# Patient Record
Sex: FEMALE | Race: WHITE
[De-identification: names, ages, dates, MRNs, and addresses within clinical notes are randomized per-mention and may not be internally consistent; named-entity substitution may affect disease eponyms.]

---

## 2017-02-09 ENCOUNTER — HOSPITAL ENCOUNTER (OUTPATIENT)
Dept: HOSPITAL 74 - FMAMMOTONE | Age: 42
Discharge: HOME | End: 2017-02-09
Payer: COMMERCIAL

## 2017-02-09 DIAGNOSIS — N60.12: ICD-10-CM

## 2017-02-09 DIAGNOSIS — D24.2: Primary | ICD-10-CM

## 2017-02-09 PROCEDURE — 0HBU0ZX EXCISION OF LEFT BREAST, OPEN APPROACH, DIAGNOSTIC: ICD-10-PCS

## 2017-02-10 NOTE — PATH
Surgical Pathology Report



Patient Name:  LEXY YI

Accession #:  

[facility]. Rec. #:  [id number]                                                        

   /Age/Gender:  1975 (Age: 41) / F

Account:  [id number]                                                          

             Location: [facility]

Taken:  2017

Received:  2017

Reported:  2/10/2017

Physicians:  Galdino Luna M.D.

  



Specimen(s) Received

 LEFT BREAST SPECIMEN WITH MASS 





Clinical History

Nonpalpable lesion

Mammographic findings: Suspicious







Final Diagnosis

LEFT BREAST, STEREOTACTIC NEEDLE CORE BIOPSY:

FIBROADENOMA, AND BENIGN BREAST TISSUE WITH FIBROCYSTIC CHANGES INCLUDING

STROMAL FIBROSIS AND DUCTAL DILATATION WITH RARE ASSOCIATED CALCIFICATION.



Comment:  Recommend correlation with clinical and radiologic findings and follow

up as clinically indicated.





***Electronically Signed***

Prashant Massey M.D.





Gross Description

Received in formalin, labeled "left breast with mass," are multiple tan-yellow,

cylindrical portions of fibroadipose tissue ranging from 0.5-1.8 cm. in length

and averaging 0.2 cm. in diameter. The specimen is submitted in toto in 4

cassette. 

Time to formalin fixation: 5 minutes

Total formalin fixation time: Approximately 9.5 hours.

[facility]/2017



[facility]/2017

## 2017-06-23 ENCOUNTER — HOSPITAL ENCOUNTER (INPATIENT)
Dept: HOSPITAL 74 - JSAMEDAYSX | Age: 42
LOS: 1 days | Discharge: HOME | DRG: 621 | End: 2017-06-24
Attending: SURGERY | Admitting: SURGERY
Payer: COMMERCIAL

## 2017-06-23 VITALS — BODY MASS INDEX: 34.2 KG/M2

## 2017-06-23 DIAGNOSIS — J45.909: ICD-10-CM

## 2017-06-23 DIAGNOSIS — E06.3: ICD-10-CM

## 2017-06-23 DIAGNOSIS — Z71.3: ICD-10-CM

## 2017-06-23 DIAGNOSIS — E66.01: Primary | ICD-10-CM

## 2017-06-23 DIAGNOSIS — K21.9: ICD-10-CM

## 2017-06-23 DIAGNOSIS — Z87.891: ICD-10-CM

## 2017-06-23 LAB
ALBUMIN SERPL-MCNC: 3.9 G/DL (ref 3.4–5)
ALP SERPL-CCNC: 60 U/L (ref 45–117)
ALT SERPL-CCNC: 32 U/L (ref 12–78)
ANION GAP SERPL CALC-SCNC: 8 MMOL/L (ref 8–16)
AST SERPL-CCNC: 20 U/L (ref 15–37)
BILIRUB SERPL-MCNC: 0.6 MG/DL (ref 0.2–1)
CALCIUM SERPL-MCNC: 8.3 MG/DL (ref 8.5–10.1)
CO2 SERPL-SCNC: 27 MMOL/L (ref 21–32)
CREAT SERPL-MCNC: 0.9 MG/DL (ref 0.55–1.02)
DEPRECATED RDW RBC AUTO: 14.2 % (ref 11.6–15.6)
GLUCOSE SERPL-MCNC: 163 MG/DL (ref 74–106)
MCH RBC QN AUTO: 29.7 PG (ref 25.7–33.7)
MCHC RBC AUTO-ENTMCNC: 33.4 G/DL (ref 32–36)
MCV RBC: 89 FL (ref 80–96)
PLATELET # BLD AUTO: 151 K/MM3 (ref 134–434)
PMV BLD: 8.7 FL (ref 7.5–11.1)
PROT SERPL-MCNC: 6.9 G/DL (ref 6.4–8.2)
WBC # BLD AUTO: 12.7 K/MM3 (ref 4–10)

## 2017-06-23 PROCEDURE — 0DB64Z3 EXCISION OF STOMACH, PERCUTANEOUS ENDOSCOPIC APPROACH, VERTICAL: ICD-10-PCS | Performed by: SURGERY

## 2017-06-23 RX ADMIN — SODIUM CHLORIDE SCH MLS: 9 INJECTION, SOLUTION INTRAVENOUS at 17:00

## 2017-06-23 RX ADMIN — ENOXAPARIN SODIUM SCH MG: 40 INJECTION SUBCUTANEOUS at 21:12

## 2017-06-23 RX ADMIN — HYDROMORPHONE HYDROCHLORIDE PRN MG: 1 INJECTION, SOLUTION INTRAMUSCULAR; INTRAVENOUS; SUBCUTANEOUS at 21:12

## 2017-06-23 RX ADMIN — FAMOTIDINE SCH MLS/HR: 20 INJECTION, SOLUTION INTRAVENOUS at 21:12

## 2017-06-23 NOTE — HP
DATE OF ADMISSION:  06/23/2017

 

CHIEF COMPLAINT:  Morbid obesity.

 

HISTORY OF PRESENT ILLNESS:  This patient is a 41-year-old woman with a history of

morbid obesity despite many attempts at dietary weight loss.  The patient also has

sleep apnea, which is significant because of her morbid obesity, and she was worked

up with cardiac, nutrition, psychological evaluations and clearances prior to

undergoing elective sleeve gastrectomy surgery.  

 

PAST MEDICAL HISTORY:  Significant for:

1.  Hypothyroidism. 

2.  Anxiety.

 

PAST SURGICAL HISTORY:  Significant for:

1.  Laparoscopic cholecystectomy.

2.  Hysterectomy.

3.  Tubal ligation. 

 

ALLERGIES:  Patient has no known allergies.

 

REVIEW OF SYSTEMS:  

1.  Review of the pulmonary system within normal limits.

2.  Review of the cardiovascular within normal limits.

3.  Review of the gastrointestinal system within normal limits.

4.  Review of the musculoskeletal system within normal limits.

 

PHYSICAL EXAMINATION:

General:  Patient is awake, alert, slightly to moderately anxious, in no acute

distress.

HEENT:  No masses palpated.  

Lungs:  Clear bilaterally.

Heart:  Regular sinus rhythm.

Abdomen:  Well-healed incisions, positive for obesity, soft, nontender on palpation. 

 

Extremities:  Within normal limits.  

 

IMPRESSION:  Morbid obesity.  

 

PLAN:  OR for laparoscopic vertical sleeve gastrectomy. 

 

 

TL [id number]

DD: 06/23/2017 11:45

DT: 06/23/2017 12:32

Job #:  [id number]

## 2017-06-23 NOTE — OP
DATE OF OPERATION:  06/23/2017

 

PREOPERATIVE DIAGNOSES:

1.  Morbid obesity.

2.  Sleep apnea.

 

POSTOPERATIVE DIAGNOSES: 

1.  Morbid obesity.

2.  Sleep apnea.

3.  Abdominal adhesions.

 

PROCEDURE PERFORMED:  

1.  Laparoscopic vertical sleeve gastrectomy.

2.  Laparoscopic lysis of adhesions.

3.  Diagnostic laparoscopy.

 

OPERATING SURGEON:  Kt Sarmiento MD 

 

ASSISTANT:  Wilfrido Gauthier MD

 

ANESTHESIA:  General.

 

EXPECTED BLOOD LOSS:  30 mL.

 

DESCRIPTION OF PROCEDURE:  The patient was brought into the operating room, placed on

the OR table in a supine position.  All precautions were taken initially including

padding for the back and the feet, and Venodyne boots were placed on both lower

extremities.  At that point, the abdomen was prepped and draped in the usual manner. 

A Veress needle was placed in the left upper quadrant, and a pneumoperitoneum was

established.  A No. 12 bladeless trocar was placed in the left upper quadrant. 

Through that trocar, laparoscopic camera was placed.  Upon placing the camera, there

was noted to be a large amount of adhesions in the left upper quadrant, which was

going to reduce the visibility of the stomach.  A No. 15 bladeless trocar was then

placed in the midline in a supraumbilical position and a No. 5 bladeless trocar in

the right upper quadrant.  Placing the camera now through the No. 15 trocar in the

midline and using a No. 5 right upper quadrant trocar, the adhesions in the left

upper quadrant were lysed with laparoscopic scissors.  Once they were completely

freed up, the left upper quadrant was now free of adhesions, and visualization of the

stomach was more proper.  At this point, a No. 5 bladeless trocar was placed in the

left upper quadrant below the left costal margin.  A Stephen liver retractor was

placed in the epigastrium to retract the left lobe of the liver.

 

The patient was then placed in 20-degree reverse Trendelenburg position, and the

pylorus was found on the distal stomach.  Then 6 cm was measured proximally to there,

and there on the greater curve with the operating surgeon lifting the stomach up

toward the anterior abdominal wall, the assistant surgeon retracted the gastrocolic

ligament inferiorly.  The LigaSure device was used to dissect the gastrocolic

ligament off the greater curve of the stomach.  This continued in a superior and

vertical direction as I continued to dissect the short gastric vessels off the

greater curve until a final short gastric vessel was divided between the superior

pole of the spleen and the proximal fundus.

 

At this juncture, Anesthesia removed the orogastric tube and placed a No. 40 bougie. 

With the bougie held along the lesser curve, a series of staples was performed with

the first 2 staples being backloaded 6 cm in length along the bougie.  This was

followed by a series of purple load staples continuing until a final staple was fired

in the left upper quadrant and the greater curve was now completely detached from the

lesser curve.  At this juncture, saline was placed around the staple line. 

Anesthesia inserted air into the bougie, which showed the entire stomach distended

down to the pylorus.  No obstruction and no leaks were noted.  At this point, the

resected greater curve was removed through the No. 15 trocar site and sent off the

field as specimen.  Under direct vision, the No. 15 and No. 12 trocars were closed

with Endo Close device to prevent internal hernia and prevent bleeding.  Under direct

vision, all trocars were removed and pneumoperitoneum was released.

 

All trocar sites then received 0.25% Marcaine and were closed with 4-0 Biosyn in

subcuticular fashion.  Dressings were applied.  The patient was awoken from

anesthesia and transferred out of the operating room to the recovery room in stable

condition.  

 

 

 

TL [id number]

DD: 06/23/2017 11:42

DT: 06/23/2017 13:33

Job #:  [id number]

## 2017-06-24 VITALS — HEART RATE: 49 BPM | TEMPERATURE: 98.6 F | SYSTOLIC BLOOD PRESSURE: 140 MMHG | DIASTOLIC BLOOD PRESSURE: 86 MMHG

## 2017-06-24 LAB
ALBUMIN SERPL-MCNC: 3.3 G/DL (ref 3.4–5)
ALP SERPL-CCNC: 47 U/L (ref 45–117)
ALT SERPL-CCNC: 24 U/L (ref 12–78)
ANION GAP SERPL CALC-SCNC: 9 MMOL/L (ref 8–16)
AST SERPL-CCNC: 16 U/L (ref 15–37)
BILIRUB SERPL-MCNC: 0.7 MG/DL (ref 0.2–1)
CALCIUM SERPL-MCNC: 7.7 MG/DL (ref 8.5–10.1)
CO2 SERPL-SCNC: 28 MMOL/L (ref 21–32)
CREAT SERPL-MCNC: 0.6 MG/DL (ref 0.55–1.02)
DEPRECATED RDW RBC AUTO: 13.5 % (ref 11.6–15.6)
GLUCOSE SERPL-MCNC: 87 MG/DL (ref 74–106)
MCH RBC QN AUTO: 30.6 PG (ref 25.7–33.7)
MCHC RBC AUTO-ENTMCNC: 34.4 G/DL (ref 32–36)
MCV RBC: 88.9 FL (ref 80–96)
PLATELET # BLD AUTO: 152 K/MM3 (ref 134–434)
PMV BLD: 9 FL (ref 7.5–11.1)
PROT SERPL-MCNC: 5.8 G/DL (ref 6.4–8.2)
WBC # BLD AUTO: 9.1 K/MM3 (ref 4–10)

## 2017-06-24 RX ADMIN — ACETAMINOPHEN PRN MG: 10 INJECTION, SOLUTION INTRAVENOUS at 09:59

## 2017-06-24 RX ADMIN — ACETAMINOPHEN PRN MG: 10 INJECTION, SOLUTION INTRAVENOUS at 01:45

## 2017-06-24 RX ADMIN — HYDROMORPHONE HYDROCHLORIDE PRN MG: 1 INJECTION, SOLUTION INTRAMUSCULAR; INTRAVENOUS; SUBCUTANEOUS at 01:45

## 2017-06-24 RX ADMIN — HYDROMORPHONE HYDROCHLORIDE PRN MG: 1 INJECTION, SOLUTION INTRAMUSCULAR; INTRAVENOUS; SUBCUTANEOUS at 06:32

## 2017-06-24 RX ADMIN — HYDROMORPHONE HYDROCHLORIDE PRN MG: 1 INJECTION, SOLUTION INTRAMUSCULAR; INTRAVENOUS; SUBCUTANEOUS at 11:08

## 2017-06-24 RX ADMIN — FAMOTIDINE SCH MLS/HR: 20 INJECTION, SOLUTION INTRAVENOUS at 10:01

## 2017-06-24 RX ADMIN — ENOXAPARIN SODIUM SCH MG: 40 INJECTION SUBCUTANEOUS at 10:02

## 2017-06-24 RX ADMIN — SODIUM CHLORIDE SCH MLS/HR: 9 INJECTION, SOLUTION INTRAVENOUS at 01:45

## 2017-06-24 NOTE — PN
Progress Note (short form)





- Note


Progress Note: 


POD#1





T(max)-100.2


Presently afebrile 


P-50-60


BP-124/68





Pt doing well


No N/V


C/O gas pain in epigastrium


OOB in chair, walking frequently


Using incentive spirometer





P/E- all incisions clean, dry





WBC-9.1


H/H-11.3/34.8





UGI- no leak, no obstruction





P- Begin PO clear liquids- 2 oz PO TID


    Encourage OOB


    Cont DVT prophylaxis

## 2017-06-25 NOTE — PN
Progress Note, Physician


Chief Complaint: 


s/p lap gastric sleeve under general anesthesia


History of Present Illness: 


post op day one





- Objective


Vital Signs: 


 Vital Signs











Temperature  98.6 F   06/24/17 14:00


 


Pulse Rate  49 L  06/24/17 14:00


 


Respiratory Rate  18   06/24/17 14:00


 


Blood Pressure  140/86   06/24/17 14:00


 


O2 Sat by Pulse Oximetry (%)  97   06/24/17 09:00











Constitutional: Yes: Well Nourished


Cardiovascular: Yes: WNL


Respiratory: Yes: WNL


Gastrointestinal: Yes: Abdomen, Obese, Tenderness


Labs: 


 CBC, BMP





 06/24/17 07:19 





 06/24/17 07:19 











Assessment/Plan


patient complaining of gas pain after swallow study, incision pain minimal, no 

nausea or vomiting, no other adverse effects of anesthetic, dept of anesthesia 

will sign off care at this time.

## 2017-06-26 NOTE — PATH
Surgical Pathology Report



Patient Name:  LEXY YI

Accession #:  [id number]

[facility]. Rec. #:  [id number]                                                        

   /Age/Gender:  1975 (Age: 41) / F

Account:  [id number]                                                          

             Location: [facility] [address] [facility]

Taken:  2017

Received:  2017

Reported:  2017

Physicians:  Kt Sarmiento M.D.

  



Specimen(s) Received

 GREATER CURVATURE STOMACH 





Clinical History

Morbid obesity







Final Diagnosis

STOMACH, GREATER CURVATURE, LAPAROSCOPIC VERTICAL SLEEVE GASTRECTOMY:

PORTION OF STOMACH WITH PATCHY MILD CHRONIC GASTRITIS.





***Electronically Signed***

Alexander Finkelstein, M.D.





Gross Description

Received in formalin, labeled "greater curvature of stomach" is a 101 gram, 24.0

x 6.5 x 5.0 cm portion of stomach with a stapled margin of resection. The serosa

is tan-gray with minimal attached fat. The mucosa is tan-pink with flattened

folds. No mucosal masses are identified. Representative sections are submitted

in one cassette.

/2017



[facility]

## 2020-01-12 ENCOUNTER — FORM ENCOUNTER (OUTPATIENT)
Age: 45
End: 2020-01-12

## 2020-01-13 ENCOUNTER — HOSPITAL ENCOUNTER (OUTPATIENT)
Dept: HOSPITAL 74 - JRADUS | Age: 45
Discharge: HOME | End: 2020-01-13
Payer: COMMERCIAL

## 2020-01-13 DIAGNOSIS — N60.21: ICD-10-CM

## 2020-01-13 DIAGNOSIS — D24.1: Primary | ICD-10-CM

## 2020-01-13 PROCEDURE — A4648 IMPLANTABLE TISSUE MARKER: HCPCS

## 2020-01-13 PROCEDURE — 0H9T3ZX DRAINAGE OF RIGHT BREAST, PERCUTANEOUS APPROACH, DIAGNOSTIC: ICD-10-PCS

## 2020-01-14 NOTE — PATH
Surgical Pathology Report



Patient Name:  LEXY YI

Accession #:  

Med. Rec. #:  [id number]                                                        

   /Age/Gender:  1975 (Age: 44) / F

Account:  [id number]                                                          

             Location: RADIOLOGY [facility]

Taken:  2020

Received:  2020

Reported:  2020

Physicians:  Galdino Ibanez M.D.

  



Specimen(s) Received

 U/S GUIDED 14G CORE BX OF LEFT BREAST 3:00 0.9 CM MASS 





Clinical History

Nonpalpable lesion

Postoperative diagnosis: Suspicious







Final Diagnosis

LEFT BREAST, 3:00, ULTRASOUND GUIDED NEEDLE BIOPSY:

SCLEROSING INTRADUCTAL PAPILLOMA WITH FOCAL ATYPIA, AND FIBROADENOMATOID

CHANGES.



Comment: This case was discussed with Dr. Gramajo at 2:18 PM on 2020.





***Electronically Signed***

Prashant Massey M.D.





Gross Description

Received in formalin labeled "left breast 3:00," are 4 tan-yellow, cylindrical

portions of fibroadipose tissue ranging from 0.6-1.2 cm in length and averaging

0.1 cm in diameter. The specimens are submitted in toto in one cassette.



Time to formalin fixation: Less than one minute

Total formalin fixation time: Approximately 8 hours.

DL/2020



saudi/2020

## 2020-02-03 ENCOUNTER — FORM ENCOUNTER (OUTPATIENT)
Age: 45
End: 2020-02-03

## 2020-02-05 ENCOUNTER — FORM ENCOUNTER (OUTPATIENT)
Age: 45
End: 2020-02-05

## 2020-02-05 NOTE — HP
Admitting History and Physical





- Primary Care Physician


PCP: Toney Bernal





- Admission


Chief Complaint: left breast atypia


History of Present Illness: 





Patient is a 43 yo female with dense breasts on mammo, who also underwent an US 

which was c/w a left breast mass at 3 oclock position.  Patient had a left US 

guided core bx on 2020 which was c/w intraductal papilloma with atypia. 

Patient is now presenting for a left breast WE with NL.   


History Source: Patient


Limitations to Obtaining History: No Limitations





- Past Medical History


Gastrointestinal: Yes: Irritable Bowel Disease


...LMP: 10


Endocrine: Yes: Hypothyroidism, Other (Hashimoto's thyroiditis)





- Past Surgical History


Past Surgical History: Yes: Appendectomy, Cholecystectomy,  (x2), 

Hysterectomy


Additional Past Surgical History: 





Gastric sleeve (2017)


laminectomy


Left knee ACL repair


Cystectomy x 5





- Smoking History


Smoking history: Former smoker


Have you smoked in the past 12 months: No


Aproximately how many cigarettes per day: 20


If you are a former smoker, when did you quit?: 2YRS AGO





- Alcohol/Substance Use


Hx Alcohol Use: Yes (SOCIALLY)





- Social History


Occupation: works in [employer] at [employer] 





Home Medications





- Allergies


Allergies/Adverse Reactions: 


 Allergies











Allergy/AdvReac Type Severity Reaction Status Date / Time


 


No Known Drug Allergies Allergy  (THROAT Verified 17 07:20





   SWELLING)  


 


RAISINS Allergy Intermediate  Uncoded 17 07:20














- Home Medications


Home Medications: 


Ambulatory Orders





Levothyroxine [Synthroid -] 175 mcg PO DAILY #0 tablet 12 


Alprazolam [Xanax] 0.5 mg PO PRN PRN #0 tablet 02/06/15 


Famotidine [Pepcid] 20 mg PO BID #60 tablet 17 


Oxycodone HCl/Acetaminophen [Percocet 5-325 mg Tablet] 1 tab PO Q6H PRN #20 

tablet MDD 4 17 











Family Medical History


Family Hx Cancer: Grandfather (maternal) (prostate cancer), Father (lung cancer)


Other Family History: maternal GM-cirrhosis.  mat aunt-liver cancer.  mat uncle-

CRC





Review of Systems





- Review of Systems


Constitutional: reports: No Symptoms


Cardiovascular: reports: No Symptoms


Respiratory: reports: No Symptoms





Physical Examination


Constitutional: Yes: Well Nourished, Calm


Breast(s): Yes: Other (Ptotic B-cup without skin changes or nipple discharge. 

No suspicious masses or bilateral adenopathy noted on exam.)





Problem List





- Problems


(1) Atypical hyperplasia of left breast


Code(s): N60.92 - UNSPECIFIED BENIGN MAMMARY DYSPLASIA OF LEFT BREAST   





Assessment/Plan





Left breast WE with NL

## 2020-02-06 ENCOUNTER — HOSPITAL ENCOUNTER (OUTPATIENT)
Dept: HOSPITAL 74 - FASU | Age: 45
Discharge: HOME | End: 2020-02-06
Attending: SURGERY
Payer: COMMERCIAL

## 2020-02-06 VITALS — SYSTOLIC BLOOD PRESSURE: 107 MMHG | HEART RATE: 57 BPM | DIASTOLIC BLOOD PRESSURE: 52 MMHG

## 2020-02-06 VITALS — BODY MASS INDEX: 21.2 KG/M2

## 2020-02-06 VITALS — TEMPERATURE: 98.4 F

## 2020-02-06 DIAGNOSIS — D24.2: Primary | ICD-10-CM

## 2020-02-06 DIAGNOSIS — N60.92: ICD-10-CM

## 2020-02-06 PROCEDURE — 0HBU0ZZ EXCISION OF LEFT BREAST, OPEN APPROACH: ICD-10-PCS | Performed by: SURGERY

## 2020-02-06 NOTE — OP
DATE OF OPERATION:  02/06/2020

 

PREOPERATIVE DIAGNOSIS:  Left breast sclerosed papilloma with atypia.

 

POSTOPERATIVE DIAGNOSIS:  Left breast sclerosed papilloma with atypia, status post

excision.

 

PROCEDURE:  Left breast wide excision with mammographic needle localization.

 

ANESTHESIA:  Local with IV sedation.

 

SURGEON:  Salas Bernal MD

 

FIRST ASSIST:  MARIA L Lazcano

 

COMPLICATIONS:  None.

 

Briefly, the patient is a 44-year-old, G2, P2, postmenopausal white female of [place]ag[place]

descent.  She has no family history of breast or ovarian cancer.  Her maternal

grandfather had prostate cancer and maternal uncle had colon cancer.  Father had lung

cancer and maternal aunt had liver cancer.  The patient underwent a mammography and

ultrasound which showed a left breast 3 o'clock, 9-mm density.  An ultrasound-guided

core biopsy showed a sclerosed intraductal papilloma with atypia.  I reviewed the

films, and the density was slightly irregular and, given the pathology, needed to be

excised widely.  The patient understood the procedure and was brought in for a left

breast wide excision with mammographic needle localization on February 6, 2020.

 

She first underwent the mammographic localization and then was brought to the holding

area.  In the holding area, site verification was made, and informed consent was

obtained.

 

She was brought into the operating room and laid on the OR table in the supine

position.  Venodynes were placed on the lower extremities.  She received IV sedation,

and the left breast was sterilely prepped and draped in the usual fashion.  Timeout

was performed.  Next, 1% lidocaine was given in a curvilinear fashion in the

periareolar region of the left breast nipple-areolar complex.  Dissection was

undertaken around the needle localization wire, and the breast tissue was completely

removed from around the wire with the wire intact within the middle of the specimen. 

The specimen was oriented with a long lateral, short superior suture, and specimen

radiographs showed removal of the density and clip in question.  The density was

actually easily palpable after the tissue was dissected.  It was grossly fully

removed.  The specimen underwent a specimen radiograph, showed removal of the clip in

question.  It was oriented with a long lateral, short superior suture and sent to

Pathology in formalin as specimen.  Hemostasis was achieved.  The breast parenchyma

was reapproximated using 2-0 plain suture, and the skin was closed using interrupted

3-0 deep dermal Vicryl suture and a running 4-0 subcuticular Biosyn suture.  Mastisol

and Steri-Strips were applied over the wound, with a compressive dressing placed over

this.  She tolerated the procedure well, without difficulty and was brought to the

postanesthesia care unit in stable condition.

 

She will be discharged home the same day once discharge criteria are met.

 

 

SALAS BERNAL M.D.

 

[id number]

DD: 02/06/2020 11:35

DT: 02/06/2020 12:00

Job #:  [id number]

## 2020-02-10 NOTE — PATH
Surgical Pathology Report



Patient Name:  LEXY YI

Accession #:  

Med. Rec. #:  [id number]                                                        

   /Age/Gender:  1975 (Age: 44) / F

Account:  [id number]                                                          

             Location: [facility] 

Taken:  2020

Received:  2020

Reported:  2/10/2020

Physicians:  Toney Bernal M.D.

  



Specimen(s) Received

 LEFT BREAST WIDE EXCISON 





Clinical History

Core with sclerosing papilloma with atypia







Final Diagnosis

BREAST, LEFT, WIDE EXCISION:

BENIGN BREAST PARENCHYMA WITH FIBROADENOMATOID CHANGES INCLUDING STROMAL

FIBROSIS, MICROCYSTS, CYSTIC APOCRINE METAPLASIA, USUAL DUCTAL HYPERPLASIA,

SCLEROSING ADENOSIS, FOCAL COLUMNAR CELL CHANGES, AND ASSOCIATED

MICROCALCIFICATIONS.

CHANGES OF PRIOR BIOPSY PRESENT.



Comment: Immunohistochemical stains performed and interpreted at [facility] for P63 and [facility] used to evaluate this case. Prior material

is noted.



Positive and negative controls (internal if applicable) show appropriate

results.







***Electronically Signed***

Yeimy Hurley M.D.





Gross Description

Received in formalin, labeled "left breast wide excision," is a 4.4 x 2.7 x 1.7

cm. tan-yellow, irregular, portion of fibroadipose tissue with a needle

localization wire present. There is a short suture marking the superior aspect

and a long suture marking the lateral aspect, per the surgeon. There is no skin

or nipple present. The specimen is inked as follows: superior and lateral blue;

inferior green; medial yellow; anterior red; deep black. The specimen is

serially sectioned from lateral to medial. Sectioning reveals abundant dense,

white, focally firm fibrous tissue. There is a grey metallic biopsy clip

identified. No definitive mass is identified. The specimen is entirely and

sequentially submitted in 11 cassettes with a lateral margin in cassette 1 and

the medial margin in cassette 11 (biopsy clip in cassette 3).



Total formalin fixation time: 31 hours.

## 2020-02-11 ENCOUNTER — FORM ENCOUNTER (OUTPATIENT)
Age: 45
End: 2020-02-11

## 2021-01-20 ENCOUNTER — NON-APPOINTMENT (OUTPATIENT)
Age: 46
End: 2021-01-20

## 2021-01-20 DIAGNOSIS — R92.8 OTHER ABNORMAL AND INCONCLUSIVE FINDINGS ON DIAGNOSTIC IMAGING OF BREAST: ICD-10-CM

## 2021-01-20 PROBLEM — Z00.00 ENCOUNTER FOR PREVENTIVE HEALTH EXAMINATION: Status: ACTIVE | Noted: 2021-01-20

## 2021-01-28 ENCOUNTER — HOSPITAL ENCOUNTER (OUTPATIENT)
Dept: HOSPITAL 74 - FMAMMOTONE | Age: 46
Discharge: HOME | End: 2021-01-28
Attending: SURGERY
Payer: COMMERCIAL

## 2021-01-28 DIAGNOSIS — D05.12: Primary | ICD-10-CM

## 2021-01-28 PROCEDURE — A4648 IMPLANTABLE TISSUE MARKER: HCPCS

## 2021-01-28 PROCEDURE — 0H9U3ZX DRAINAGE OF LEFT BREAST, PERCUTANEOUS APPROACH, DIAGNOSTIC: ICD-10-PCS

## 2021-02-02 ENCOUNTER — NON-APPOINTMENT (OUTPATIENT)
Age: 46
End: 2021-02-02

## 2021-02-10 ENCOUNTER — APPOINTMENT (OUTPATIENT)
Dept: HEMATOLOGY ONCOLOGY | Facility: CLINIC | Age: 46
End: 2021-02-10

## 2021-02-11 ENCOUNTER — NON-APPOINTMENT (OUTPATIENT)
Age: 46
End: 2021-02-11

## 2021-02-17 ENCOUNTER — FORM ENCOUNTER (OUTPATIENT)
Age: 46
End: 2021-02-17

## 2021-02-18 ENCOUNTER — NON-APPOINTMENT (OUTPATIENT)
Age: 46
End: 2021-02-18

## 2021-02-19 ENCOUNTER — APPOINTMENT (OUTPATIENT)
Dept: BREAST CENTER | Facility: HOSPITAL | Age: 46
End: 2021-02-19

## 2021-02-22 ENCOUNTER — NON-APPOINTMENT (OUTPATIENT)
Age: 46
End: 2021-02-22

## 2021-02-26 ENCOUNTER — APPOINTMENT (OUTPATIENT)
Dept: BREAST CENTER | Facility: CLINIC | Age: 46
End: 2021-02-26
Payer: COMMERCIAL

## 2021-02-26 VITALS
SYSTOLIC BLOOD PRESSURE: 106 MMHG | WEIGHT: 166 LBS | DIASTOLIC BLOOD PRESSURE: 66 MMHG | BODY MASS INDEX: 23.24 KG/M2 | HEIGHT: 71 IN

## 2021-02-26 DIAGNOSIS — Z80.42 FAMILY HISTORY OF MALIGNANT NEOPLASM OF PROSTATE: ICD-10-CM

## 2021-02-26 DIAGNOSIS — Z87.891 PERSONAL HISTORY OF NICOTINE DEPENDENCE: ICD-10-CM

## 2021-02-26 DIAGNOSIS — Z80.3 FAMILY HISTORY OF MALIGNANT NEOPLASM OF BREAST: ICD-10-CM

## 2021-02-26 DIAGNOSIS — D05.12 INTRADUCTAL CARCINOMA IN SITU OF LEFT BREAST: ICD-10-CM

## 2021-02-26 DIAGNOSIS — Z85.3 PERSONAL HISTORY OF MALIGNANT NEOPLASM OF BREAST: ICD-10-CM

## 2021-02-26 DIAGNOSIS — Z80.1 FAMILY HISTORY OF MALIGNANT NEOPLASM OF TRACHEA, BRONCHUS AND LUNG: ICD-10-CM

## 2021-02-26 DIAGNOSIS — Z86.39 PERSONAL HISTORY OF OTHER ENDOCRINE, NUTRITIONAL AND METABOLIC DISEASE: ICD-10-CM

## 2021-02-26 PROCEDURE — 99024 POSTOP FOLLOW-UP VISIT: CPT

## 2021-02-26 RX ORDER — LEVOTHYROXINE SODIUM 175 UG/1
175 TABLET ORAL
Refills: 0 | Status: ACTIVE | COMMUNITY

## 2021-02-26 NOTE — PAST MEDICAL HISTORY
[Menarche Age ____] : age at menarche was [unfilled] [Menopause Age____] : age at menopause was [unfilled] [Total Preg ___] : G[unfilled] [Live Births ___] : P[unfilled]  [Age At Live Birth ___] : Age at live birth: [unfilled] [Postmenopausal] : The patient is postmenopausal [History of Hormone Replacement Treatment] : has no history of hormone replacement treatment [de-identified] : s/p hysterectomy in 2011

## 2021-02-26 NOTE — REASON FOR VISIT
[Post Op: _________] : a [unfilled] post op visit [FreeTextEntry1] : The patient comes in after a left breast wide excision for low-grade DCIS.

## 2021-02-26 NOTE — PHYSICAL EXAM
[Normocephalic] : normocephalic [Atraumatic] : atraumatic [EOMI] : extra ocular movement intact [Supple] : supple [No Supraclavicular Adenopathy] : no supraclavicular adenopathy [No Cervical Adenopathy] : no cervical adenopathy [Examined in the supine and seated position] : examined in the supine and seated position [No dominant masses] : no dominant masses in right breast  [No dominant masses] : no dominant masses left breast [No Nipple Retraction] : no left nipple retraction [No Nipple Discharge] : no left nipple discharge [Breast Mass Right Breast ___cm] : no masses [Breast Mass Left Breast ___cm] : no masses [Breast Nipple Inversion] : nipples not inverted [Breast Nipple Retraction] : nipples not retracted [Breast Nipple Flattening] : nipples not flattened [Breast Nipple Fissures] : nipples not fissured [Breast Abnormal Lactation (Galactorrhea)] : no galactorrhea [Breast Abnormal Secretion Bloody Fluid] : no bloody discharge [Breast Abnormal Secretion Serous Fluid] : no serous discharge [Breast Abnormal Secretion Opalescent Fluid] : no milky discharge [No Axillary Lymphadenopathy] : no left axillary lymphadenopathy [No Edema] : no edema [No Rashes] : no rashes [No Ulceration] : no ulceration [de-identified] : On exam, the patient has a healing left breast periareolar partial mastectomy incision with no signs of any infection or hematoma. [de-identified] : Healing left breast periareolar partial mastectomy incision

## 2021-02-26 NOTE — ASSESSMENT
[FreeTextEntry1] : The patient is a 45-year-old G2, P2 postmenopausal white female of [place] descent.  She underwent menarche at age 10 and had her first child at age 28.  She underwent a hysterectomy in 2011 but kept her ovaries and never took any hormone replacement therapy.  She has no family history of breast or ovarian cancer.  Her maternal grandfather had prostate cancer at age 79 and a maternal uncle had colorectal cancer at age 73.  Her father had lung cancer at age 54.  A maternal aunt had liver cancer at age 56.  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  She underwent a left breast wide excision on February 6, 2020 and final pathology just showed stromal fibrosis but no further atypia.  She then was found to have some new calcifications in the left breast lower outer quadrant on screening mammography in January 2021 and underwent a stereotactic core biopsy on January 28, 2021 which came back with low-grade DCIS which was ER/MI strongly positive.  She underwent an MRI which showed no multifocal or contralateral disease.  She underwent genetic testing through [facility] which was negative.  She then underwent a left breast partial mastectomy at [facility] on February 19, 2021 and final pathology showed no further DCIS or cancer.  On exam today she is healing nicely from her left breast periareolar partial mastectomy incision.  I performed an ultrasound and she has just typical postop changes with no significant seroma.  I gave her a copy of the pathology and she understands that this is an early stage 0 breast cancer with no further cancer on the wide excision.  I would like her to see radiation oncology and she would like to stay in [place] and I sent her to Dr. Hanna.  She understands the benefits for tamoxifen for risk reduction and I will see her again in 3 months to start the tamoxifen.  Her next bilateral mammography and ultrasound will not be due until January 2022.

## 2021-02-26 NOTE — HISTORY OF PRESENT ILLNESS
[FreeTextEntry1] : The patient is a 45-year-old G2, P2 postmenopausal white female of Persian descent.  She underwent menarche at age 10 and had her first child at age 28.  She underwent a hysterectomy in 2011 but kept her ovaries and never took any hormone replacement therapy.  She has no family history of breast or ovarian cancer.  Her maternal grandfather had prostate cancer at age 79 and a maternal uncle had colorectal cancer at age 73.  Her father had lung cancer at age 54.  A maternal aunt had liver cancer at age 56.  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  She underwent a left breast wide excision on February 6, 2020 and final pathology just showed stromal fibrosis but no further atypia.  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  She underwent an MRI which showed no multifocal or contralateral disease.  She underwent genetic testing through [facility] which was negative.  She then underwent a left breast partial mastectomy at [facility] on February 19, 2021 and final pathology showed no further DCIS or cancer.  She comes in today for postop follow-up.

## 2021-03-08 ENCOUNTER — NON-APPOINTMENT (OUTPATIENT)
Age: 46
End: 2021-03-08

## 2021-03-08 ENCOUNTER — FORM ENCOUNTER (OUTPATIENT)
Age: 46
End: 2021-03-08

## 2021-03-11 ENCOUNTER — NON-APPOINTMENT (OUTPATIENT)
Age: 46
End: 2021-03-11

## 2021-06-02 ENCOUNTER — APPOINTMENT (OUTPATIENT)
Dept: BREAST CENTER | Facility: CLINIC | Age: 46
End: 2021-06-02
Payer: COMMERCIAL

## 2021-06-02 VITALS
WEIGHT: 166 LBS | SYSTOLIC BLOOD PRESSURE: 125 MMHG | DIASTOLIC BLOOD PRESSURE: 80 MMHG | HEIGHT: 71 IN | BODY MASS INDEX: 23.24 KG/M2

## 2021-06-02 PROCEDURE — 99214 OFFICE O/P EST MOD 30 MIN: CPT

## 2021-06-02 PROCEDURE — 99072 ADDL SUPL MATRL&STAF TM PHE: CPT

## 2021-06-02 NOTE — REASON FOR VISIT
[Follow-Up: _____] : a [unfilled] follow-up visit [FreeTextEntry1] : The patient comes in after a left breast wide excision for low-grade DCIS.

## 2021-06-02 NOTE — PHYSICAL EXAM
[Normocephalic] : normocephalic [Atraumatic] : atraumatic [EOMI] : extra ocular movement intact [Supple] : supple [No Supraclavicular Adenopathy] : no supraclavicular adenopathy [No Cervical Adenopathy] : no cervical adenopathy [Examined in the supine and seated position] : examined in the supine and seated position [No dominant masses] : no dominant masses in right breast  [No dominant masses] : no dominant masses left breast [No Nipple Retraction] : no left nipple retraction [No Nipple Discharge] : no left nipple discharge [Breast Mass Right Breast ___cm] : no masses [Breast Mass Left Breast ___cm] : no masses [Breast Nipple Inversion] : nipples not inverted [Breast Nipple Retraction] : nipples not retracted [Breast Nipple Fissures] : nipples not fissured [Breast Nipple Flattening] : nipples not flattened [Breast Abnormal Lactation (Galactorrhea)] : no galactorrhea [Breast Abnormal Secretion Bloody Fluid] : no bloody discharge [Breast Abnormal Secretion Serous Fluid] : no serous discharge [Breast Abnormal Secretion Opalescent Fluid] : no milky discharge [No Axillary Lymphadenopathy] : no left axillary lymphadenopathy [No Edema] : no edema [No Rashes] : no rashes [No Ulceration] : no ulceration [de-identified] : On exam, the patient is a well-healed left breast periareolar incision and ptotic small B-cup breasts.  On palpation, she has some mild fibrosis but no evidence of recurrence.  She does have a fair amount of tenderness after the radiation.  She has no axillary, supraclavicular, or cervical adenopathy. [de-identified] : Well-healed left breast periareolar incision after partial mastectomy for DCIS with no evidence of recurrence but tenderness under incision site

## 2021-06-02 NOTE — ASSESSMENT
[FreeTextEntry1] : The patient is a 45-year-old G2, P2 postmenopausal white female of Syri[place] descent.  She underwent menarche at age 10 and had her first child at age 28.  She underwent a hysterectomy in 2011 but kept her ovaries and never took any hormone replacement therapy.  She has no family history of breast or ovarian cancer.  Her maternal grandfather had prostate cancer at age 79 and a maternal uncle had colorectal cancer at age 73.  Her father had lung cancer at age 54.  A maternal aunt had liver cancer at age 56.  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  She underwent a left breast wide excision on February 6, 2020 and final pathology just showed stromal fibrosis but no further atypia.  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  She underwent an MRI which showed no multifocal or contralateral disease.  She underwent genetic testing through [facility] which was negative.  She then underwent a left breast partial mastectomy at [facility] on February 19, 2021 and final pathology showed no further DCIS or cancer.   She then underwent external beam radiation to Dr. Hanna in [place] which finished in April 2021.  On exam today, she has fair amount of tenderness underneath the wide excision site but only mild fibrosis.  She has no evidence of recurrence.  She is complaining of a lot of fatigue after the radiation which should hopefully resolve soon.  She understands the benefits for tamoxifen for recurrence benefit and I explained the risk side effect profile and she will start tamoxifen now.  Her next bilateral mammography and ultrasound will not be due until January 2022 and I will see her again in 6 months.

## 2021-06-02 NOTE — HISTORY OF PRESENT ILLNESS
[FreeTextEntry1] : The patient is a 45-year-old G2, P2 postmenopausal white female of Urdu descent.  She underwent menarche at age 10 and had her first child at age 28.  She underwent a hysterectomy in 2011 but kept her ovaries and never took any hormone replacement therapy.  She has no family history of breast or ovarian cancer.  Her maternal grandfather had prostate cancer at age 79 and a maternal uncle had colorectal cancer at age 73.  Her father had lung cancer at age 54.  A maternal aunt had liver cancer at age 56.  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  She underwent a left breast wide excision on February 6, 2020 and final pathology just showed stromal fibrosis but no further atypia.  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  She underwent an MRI which showed no multifocal or contralateral disease.  She underwent genetic testing through [facility] which was negative.  She then underwent a left breast partial mastectomy at [facility] on February 19, 2021 and final pathology showed no further DCIS or cancer.  She then underwent external beam radiation to Dr. Hanna in [place] which finished in April 2021.  She comes in today for routine follow-up and discussion regarding endocrine therapy.

## 2021-06-02 NOTE — PAST MEDICAL HISTORY
[Menarche Age ____] : age at menarche was [unfilled] [Total Preg ___] : G[unfilled] [Live Births ___] : P[unfilled]  [Age At Live Birth ___] : Age at live birth: [unfilled] [History of Hormone Replacement Treatment] : has no history of hormone replacement treatment [de-identified] : s/p ABBEY but kept ovaries

## 2021-12-06 ENCOUNTER — APPOINTMENT (OUTPATIENT)
Dept: BREAST CENTER | Facility: CLINIC | Age: 46
End: 2021-12-06
Payer: COMMERCIAL

## 2021-12-06 VITALS
BODY MASS INDEX: 23.24 KG/M2 | WEIGHT: 166 LBS | SYSTOLIC BLOOD PRESSURE: 126 MMHG | HEART RATE: 82 BPM | HEIGHT: 71 IN | DIASTOLIC BLOOD PRESSURE: 84 MMHG

## 2021-12-06 PROCEDURE — 99213 OFFICE O/P EST LOW 20 MIN: CPT

## 2021-12-06 NOTE — PHYSICAL EXAM
[Normocephalic] : normocephalic [Atraumatic] : atraumatic [EOMI] : extra ocular movement intact [Supple] : supple [No Supraclavicular Adenopathy] : no supraclavicular adenopathy [No Cervical Adenopathy] : no cervical adenopathy [Examined in the supine and seated position] : examined in the supine and seated position [No dominant masses] : no dominant masses in right breast  [No dominant masses] : no dominant masses left breast [No Nipple Retraction] : no left nipple retraction [No Nipple Discharge] : no left nipple discharge [Breast Mass Right Breast ___cm] : no masses [Breast Mass Left Breast ___cm] : no masses [No Axillary Lymphadenopathy] : no left axillary lymphadenopathy [No Edema] : no edema [No Rashes] : no rashes [No Ulceration] : no ulceration [Breast Abnormal Secretion Serous Fluid] : no serous discharge [Breast Abnormal Secretion Opalescent Fluid] : no milky discharge [Breast Abnormal Secretion Opalescent Fluid Right] : milky discharge [Breast Abnormal Secretion Serous Fluid Left] : serous discharge [Breast Nipple Inversion] : nipples not inverted [Breast Nipple Retraction] : nipples not retracted [Breast Nipple Flattening] : nipples not flattened [Breast Nipple Fissures] : nipples not fissured [Breast Abnormal Lactation (Galactorrhea)] : no galactorrhea [Breast Abnormal Secretion Bloody Fluid] : no bloody discharge [de-identified] : On exam, the patient is a well-healed left breast periareolar incision and ptotic small B-cup breasts.  On palpation, she has some mild fibrosis but no evidence of recurrence.  She does have a fair amount of tenderness after the radiation.  She has no axillary, supraclavicular, or cervical adenopathy. [de-identified] : Well-healed left breast periareolar incision after partial mastectomy for DCIS with no evidence of recurrence but tenderness under incision site [de-identified] : She has noted some clear discharge from the left nipple and some milky discharge from the right nipple which has been intermittent.

## 2021-12-06 NOTE — REASON FOR VISIT
[Follow-Up: _____] : a [unfilled] follow-up visit [FreeTextEntry1] : The patient comes in with a history of undergoing a left breast lower outer quadrant wide excision for DCIS performed in February 2021 which was ER/NE positive.  She underwent external beam radiation and was placed on tamoxifen.  She comes in for routine follow-up.

## 2021-12-06 NOTE — REASON FOR VISIT
[Follow-Up: _____] : a [unfilled] follow-up visit [FreeTextEntry1] : The patient comes in with a history of undergoing a left breast lower outer quadrant wide excision for DCIS performed in February 2021 which was ER/AR positive.  She underwent external beam radiation and was placed on tamoxifen.  She comes in for routine follow-up.

## 2021-12-06 NOTE — PHYSICAL EXAM
[Normocephalic] : normocephalic [Atraumatic] : atraumatic [EOMI] : extra ocular movement intact [Supple] : supple [No Supraclavicular Adenopathy] : no supraclavicular adenopathy [No Cervical Adenopathy] : no cervical adenopathy [Examined in the supine and seated position] : examined in the supine and seated position [No dominant masses] : no dominant masses in right breast  [No dominant masses] : no dominant masses left breast [No Nipple Retraction] : no left nipple retraction [No Nipple Discharge] : no left nipple discharge [Breast Mass Right Breast ___cm] : no masses [Breast Mass Left Breast ___cm] : no masses [No Axillary Lymphadenopathy] : no left axillary lymphadenopathy [No Edema] : no edema [No Rashes] : no rashes [No Ulceration] : no ulceration [Breast Abnormal Secretion Serous Fluid] : no serous discharge [Breast Abnormal Secretion Opalescent Fluid] : no milky discharge [Breast Abnormal Secretion Opalescent Fluid Right] : milky discharge [Breast Abnormal Secretion Serous Fluid Left] : serous discharge [Breast Nipple Inversion] : nipples not inverted [Breast Nipple Retraction] : nipples not retracted [Breast Nipple Flattening] : nipples not flattened [Breast Nipple Fissures] : nipples not fissured [Breast Abnormal Lactation (Galactorrhea)] : no galactorrhea [Breast Abnormal Secretion Bloody Fluid] : no bloody discharge [de-identified] : On exam, the patient is a well-healed left breast periareolar incision and ptotic small B-cup breasts.  On palpation, she has some mild fibrosis but no evidence of recurrence.  She does have a fair amount of tenderness after the radiation.  She has no axillary, supraclavicular, or cervical adenopathy. [de-identified] : Well-healed left breast periareolar incision after partial mastectomy for DCIS with no evidence of recurrence but tenderness under incision site [de-identified] : She has noted some clear discharge from the left nipple and some milky discharge from the right nipple which has been intermittent.

## 2021-12-06 NOTE — HISTORY OF PRESENT ILLNESS
[FreeTextEntry1] : The patient is a 46-year-old G2, P2 postmenopausal white female of Mongolian descent.  She underwent menarche at age 10 and had her first child at age 28.  She underwent a hysterectomy in 2011 but kept her ovaries and never took any hormone replacement therapy.  She has no family history of breast or ovarian cancer.  Her maternal grandfather had prostate cancer at age 79 and a maternal uncle had colorectal cancer at age 73.  Her father had lung cancer at age 54.  A maternal aunt had liver cancer at age 56.  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  She underwent a left breast wide excision on February 6, 2020 and final pathology just showed stromal fibrosis but no further atypia.  She then was found to have some new calcifications in the left breast lower outer quadrant on screening mammography in January 2021 and underwent a stereotactic core biopsy on January 28, 2021 which came back with low-grade DCIS which was ER/WV strongly positive.  She underwent an MRI which showed no multifocal or contralateral disease.  She underwent genetic testing through [facility] which was negative.  She then underwent a left breast partial mastectomy at [facility] on February 19, 2021 and final pathology showed no further DCIS or cancer.  She then underwent external beam radiation to Dr. Hanna in [place] which finished in April 2021.  She was started on tamoxifen.  She continues to complain of left breast tenderness.  She continues to get yearly mammography and ultrasound.

## 2021-12-06 NOTE — PAST MEDICAL HISTORY
[Menarche Age ____] : age at menarche was [unfilled] [Total Preg ___] : G[unfilled] [Live Births ___] : P[unfilled]  [Age At Live Birth ___] : Age at live birth: [unfilled] [History of Hormone Replacement Treatment] : has no history of hormone replacement treatment [de-identified] : s/p ABBEY but kept ovaries

## 2021-12-06 NOTE — ASSESSMENT
[FreeTextEntry1] : The patient is a 46-year-old G2, P2 postmenopausal white female of Armenian descent.  She underwent menarche at age 10 and had her first child at age 28.  She underwent a hysterectomy in 2011 but kept her ovaries and never took any hormone replacement therapy.  She has no family history of breast or ovarian cancer.  Her maternal grandfather had prostate cancer at age 79 and a maternal uncle had colorectal cancer at age 73.  Her father had lung cancer at age 54.  A maternal aunt had liver cancer at age 56.  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  She underwent a left breast wide excision on February 6, 2020 and final pathology just showed stromal fibrosis but no further atypia.  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  She underwent an MRI which showed no multifocal or contralateral disease.  She underwent genetic testing through [facility] which was negative.  She then underwent a left breast partial mastectomy at [facility] on February 19, 2021 and final pathology showed no further DCIS or cancer.   She then underwent external beam radiation to Dr. Hanna in [place] which finished in April 2021.  On exam today, she has typical scarring changes in the left breast but no evidence of recurrence.  She complained of a lot of fatigue after the radiation and continues to have tenderness underneath the left breast wide excision site.  She was started on tamoxifen understood the recurrence benefits.  She does say she had some clear nipple discharge on the left side and some whitish nipple discharge from the right but this is likely benign given her negative exam.  Her next bilateral mammography and ultrasound will not be due until January 2022 and I will see her again in 6 months.  She was offered symptomatic relief with warm compresses and to wear a good supportive bra and to use nonsteroidals for her pain.  If her breast pain continues, she was offered referral to a pain specialist for a possible nerve block.

## 2021-12-06 NOTE — HISTORY OF PRESENT ILLNESS
[FreeTextEntry1] : The patient is a 46-year-old G2, P2 postmenopausal white female of [place] descent.  She underwent menarche at age 10 and had her first child at age 28.  She underwent a hysterectomy in 2011 but kept her ovaries and never took any hormone replacement therapy.  She has no family history of breast or ovarian cancer.  Her maternal grandfather had prostate cancer at age 79 and a maternal uncle had colorectal cancer at age 73.  Her father had lung cancer at age 54.  A maternal aunt had liver cancer at age 56.  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  She underwent a left breast wide excision on February 6, 2020 and final pathology just showed stromal fibrosis but no further atypia.  She then was found to have some new calcifications in the left breast lower outer quadrant on screening mammography in January 2021 and underwent a stereotactic core biopsy on January 28, 2021 which came back with low-grade DCIS which was ER/KS strongly positive.  She underwent an MRI which showed no multifocal or contralateral disease.  She underwent genetic testing through [facility] which was negative.  She then underwent a left breast partial mastectomy at [facility] on February 19, 2021 and final pathology showed no further DCIS or cancer.  She then underwent external beam radiation to Dr. Hanna in [place] which finished in April 2021.  She was started on tamoxifen.  She continues to complain of left breast tenderness.  She continues to get yearly mammography and ultrasound.

## 2021-12-06 NOTE — ASSESSMENT
[FreeTextEntry1] : The patient is a 46-year-old G2, P2 postmenopausal white female of Slov[place] descent.  She underwent menarche at age 10 and had her first child at age 28.  She underwent a hysterectomy in 2011 but kept her ovaries and never took any hormone replacement therapy.  She has no family history of breast or ovarian cancer.  Her maternal grandfather had prostate cancer at age 79 and a maternal uncle had colorectal cancer at age 73.  Her father had lung cancer at age 54.  A maternal aunt had liver cancer at age 56.  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  She underwent a left breast wide excision on February 6, 2020 and final pathology just showed stromal fibrosis but no further atypia.  She then was found to have some new calcifications in the left breast lower outer quadrant on screening mammography in January 2021 and underwent a stereotactic core biopsy on January 28, 2021 which came back with low-grade DCIS which was ER/HI strongly positive.  She underwent an MRI which showed no multifocal or contralateral disease.  She underwent genetic testing through [facility] which was negative.  She then underwent a left breast partial mastectomy at [facility] on February 19, 2021 and final pathology showed no further DCIS or cancer.   She then underwent external beam radiation to Dr. Hanna in [place] which finished in April 2021.  On exam today, she has typical scarring changes in the left breast but no evidence of recurrence.  She complained of a lot of fatigue after the radiation and continues to have tenderness underneath the left breast wide excision site.  She was started on tamoxifen understood the recurrence benefits.  She does say she had some clear nipple discharge on the left side and some whitish nipple discharge from the right but this is likely benign given her negative exam.  Her next bilateral mammography and ultrasound will not be due until January 2022 and I will see her again in 6 months.  She was offered symptomatic relief with warm compresses and to wear a good supportive bra and to use nonsteroidals for her pain.  If her breast pain continues, she was offered referral to a pain specialist for a possible nerve block.

## 2021-12-06 NOTE — PAST MEDICAL HISTORY
[Menarche Age ____] : age at menarche was [unfilled] [Total Preg ___] : G[unfilled] [Live Births ___] : P[unfilled]  [Age At Live Birth ___] : Age at live birth: [unfilled] [History of Hormone Replacement Treatment] : has no history of hormone replacement treatment [de-identified] : s/p ABBEY but kept ovaries

## 2022-01-11 ENCOUNTER — NON-APPOINTMENT (OUTPATIENT)
Age: 47
End: 2022-01-11

## 2022-02-08 ENCOUNTER — HOSPITAL ENCOUNTER (OUTPATIENT)
Dept: HOSPITAL 74 - FASUSAT | Age: 47
Setting detail: OBSERVATION
LOS: 5 days | Discharge: HOME | End: 2022-02-13
Attending: SURGERY | Admitting: SURGERY
Payer: COMMERCIAL

## 2022-02-08 VITALS — BODY MASS INDEX: 24.3 KG/M2

## 2022-02-08 DIAGNOSIS — K43.2: Primary | ICD-10-CM

## 2022-02-08 DIAGNOSIS — Z91.018: ICD-10-CM

## 2022-02-08 PROCEDURE — 96375 TX/PRO/DX INJ NEW DRUG ADDON: CPT

## 2022-02-08 PROCEDURE — 96367 TX/PROPH/DG ADDL SEQ IV INF: CPT

## 2022-02-08 PROCEDURE — G0378 HOSPITAL OBSERVATION PER HR: HCPCS

## 2022-02-08 PROCEDURE — 49654: CPT

## 2022-02-08 PROCEDURE — 96365 THER/PROPH/DIAG IV INF INIT: CPT

## 2022-02-08 PROCEDURE — 96372 THER/PROPH/DIAG INJ SC/IM: CPT

## 2022-02-08 RX ADMIN — FAMOTIDINE SCH MLS/HR: 20 INJECTION, SOLUTION INTRAVENOUS at 21:05

## 2022-02-08 RX ADMIN — TAMOXIFEN CITRATE SCH MG: 10 TABLET, FILM COATED ORAL at 22:00

## 2022-02-08 RX ADMIN — CEFAZOLIN SCH MLS/HR: 1 INJECTION, POWDER, FOR SOLUTION INTRAVENOUS at 21:05

## 2022-02-08 RX ADMIN — HYDROMORPHONE HYDROCHLORIDE PRN MG: 1 INJECTION, SOLUTION INTRAMUSCULAR; INTRAVENOUS; SUBCUTANEOUS at 21:05

## 2022-02-09 LAB
ALBUMIN SERPL-MCNC: 3.2 G/DL (ref 3.4–5)
ALP SERPL-CCNC: 39 U/L (ref 45–117)
ALT SERPL-CCNC: 21 U/L (ref 13–61)
ANION GAP SERPL CALC-SCNC: 7 MMOL/L (ref 8–16)
AST SERPL-CCNC: 30 U/L (ref 15–37)
BILIRUB SERPL-MCNC: 0.8 MG/DL (ref 0.2–1)
BUN SERPL-MCNC: 7 MG/DL (ref 7–18)
CALCIUM SERPL-MCNC: 8.3 MG/DL (ref 8.5–10)
CHLORIDE SERPL-SCNC: 102 MMOL/L (ref 98–107)
CO2 SERPL-SCNC: 28 MMOL/L (ref 21–32)
CREAT SERPL-MCNC: 0.6 MG/DL (ref 0.55–1.3)
DEPRECATED RDW RBC AUTO: 13.5 % (ref 11.6–15.6)
GLUCOSE SERPL-MCNC: 101 MG/DL (ref 74–106)
HCT VFR BLD CALC: 34.9 % (ref 32.4–45.2)
HGB BLD-MCNC: 11.6 GM/DL (ref 10.7–15.3)
MCH RBC QN AUTO: 29 PG (ref 25.7–33.7)
MCHC RBC AUTO-ENTMCNC: 33.2 G/DL (ref 32–36)
MCV RBC: 87.2 FL (ref 80–96)
PLATELET # BLD AUTO: 150 10^3/UL (ref 134–434)
PMV BLD: 9.2 FL (ref 7.5–11.1)
PROT SERPL-MCNC: 5.5 G/DL (ref 6.4–8.2)
RBC # BLD AUTO: 4 M/MM3 (ref 3.6–5.2)
SODIUM SERPL-SCNC: 137 MMOL/L (ref 136–145)
WBC # BLD AUTO: 6.4 K/MM3 (ref 4–10)

## 2022-02-09 RX ADMIN — CEFAZOLIN SCH MLS/HR: 1 INJECTION, POWDER, FOR SOLUTION INTRAVENOUS at 05:45

## 2022-02-09 RX ADMIN — TAMOXIFEN CITRATE SCH MG: 10 TABLET, FILM COATED ORAL at 21:16

## 2022-02-09 RX ADMIN — VENLAFAXINE HYDROCHLORIDE SCH MG: 37.5 CAPSULE, EXTENDED RELEASE ORAL at 09:20

## 2022-02-09 RX ADMIN — FAMOTIDINE SCH MLS/HR: 20 INJECTION, SOLUTION INTRAVENOUS at 09:15

## 2022-02-09 RX ADMIN — LEVOTHYROXINE SODIUM SCH MCG: 75 TABLET ORAL at 09:16

## 2022-02-09 RX ADMIN — HYDROMORPHONE HYDROCHLORIDE PRN MG: 1 INJECTION, SOLUTION INTRAMUSCULAR; INTRAVENOUS; SUBCUTANEOUS at 01:23

## 2022-02-09 RX ADMIN — DOCUSATE SODIUM,SENNOSIDES SCH TABLET: 50; 8.6 TABLET, FILM COATED ORAL at 09:20

## 2022-02-09 RX ADMIN — HYDROMORPHONE HYDROCHLORIDE PRN MG: 1 INJECTION, SOLUTION INTRAMUSCULAR; INTRAVENOUS; SUBCUTANEOUS at 06:11

## 2022-02-09 RX ADMIN — HYDROMORPHONE HYDROCHLORIDE PRN MG: 1 INJECTION, SOLUTION INTRAMUSCULAR; INTRAVENOUS; SUBCUTANEOUS at 13:08

## 2022-02-09 RX ADMIN — CEFAZOLIN SCH MLS/HR: 1 INJECTION, POWDER, FOR SOLUTION INTRAVENOUS at 14:47

## 2022-02-09 RX ADMIN — MEPERIDINE HYDROCHLORIDE PRN MG: 50 INJECTION INTRAMUSCULAR; INTRAVENOUS; SUBCUTANEOUS at 21:56

## 2022-02-09 RX ADMIN — DOCUSATE SODIUM,SENNOSIDES SCH TABLET: 50; 8.6 TABLET, FILM COATED ORAL at 21:16

## 2022-02-10 LAB
ALBUMIN SERPL-MCNC: 3.1 G/DL (ref 3.4–5)
ALP SERPL-CCNC: 38 U/L (ref 45–117)
ALT SERPL-CCNC: 19 U/L (ref 13–61)
ANION GAP SERPL CALC-SCNC: 6 MMOL/L (ref 8–16)
AST SERPL-CCNC: 23 U/L (ref 15–37)
BILIRUB SERPL-MCNC: 0.6 MG/DL (ref 0.2–1)
BUN SERPL-MCNC: 9 MG/DL (ref 7–18)
CALCIUM SERPL-MCNC: 8 MG/DL (ref 8.5–10)
CHLORIDE SERPL-SCNC: 104 MMOL/L (ref 98–107)
CO2 SERPL-SCNC: 29 MMOL/L (ref 21–32)
CREAT SERPL-MCNC: 0.6 MG/DL (ref 0.55–1.3)
DEPRECATED RDW RBC AUTO: 13.7 % (ref 11.6–15.6)
GLUCOSE SERPL-MCNC: 95 MG/DL (ref 74–106)
HCT VFR BLD CALC: 33.1 % (ref 32.4–45.2)
HGB BLD-MCNC: 11.7 GM/DL (ref 10.7–15.3)
MCH RBC QN AUTO: 30.3 PG (ref 25.7–33.7)
MCHC RBC AUTO-ENTMCNC: 35.3 G/DL (ref 32–36)
MCV RBC: 85.9 FL (ref 80–96)
PLATELET # BLD AUTO: 132 10^3/UL (ref 134–434)
PMV BLD: 8.9 FL (ref 7.5–11.1)
PROT SERPL-MCNC: 5.5 G/DL (ref 6.4–8.2)
RBC # BLD AUTO: 3.85 M/MM3 (ref 3.6–5.2)
SODIUM SERPL-SCNC: 139 MMOL/L (ref 136–145)
WBC # BLD AUTO: 5.4 K/MM3 (ref 4–10)

## 2022-02-10 RX ADMIN — POLYETHYLENE GLYCOL 3350 SCH GM: 17 POWDER, FOR SOLUTION ORAL at 09:50

## 2022-02-10 RX ADMIN — LEVOTHYROXINE SODIUM SCH MCG: 75 TABLET ORAL at 06:51

## 2022-02-10 RX ADMIN — DOCUSATE SODIUM,SENNOSIDES SCH TABLET: 50; 8.6 TABLET, FILM COATED ORAL at 09:50

## 2022-02-10 RX ADMIN — DOCUSATE SODIUM,SENNOSIDES SCH TABLET: 50; 8.6 TABLET, FILM COATED ORAL at 21:21

## 2022-02-10 RX ADMIN — PANTOPRAZOLE SODIUM SCH MG: 40 TABLET, DELAYED RELEASE ORAL at 09:49

## 2022-02-10 RX ADMIN — VENLAFAXINE HYDROCHLORIDE SCH MG: 37.5 CAPSULE, EXTENDED RELEASE ORAL at 09:50

## 2022-02-10 RX ADMIN — MEPERIDINE HYDROCHLORIDE PRN MG: 50 INJECTION INTRAMUSCULAR; INTRAVENOUS; SUBCUTANEOUS at 08:16

## 2022-02-10 RX ADMIN — MEPERIDINE HYDROCHLORIDE PRN MG: 50 INJECTION INTRAMUSCULAR; INTRAVENOUS; SUBCUTANEOUS at 20:02

## 2022-02-10 RX ADMIN — MEPERIDINE HYDROCHLORIDE PRN MG: 50 INJECTION INTRAMUSCULAR; INTRAVENOUS; SUBCUTANEOUS at 14:44

## 2022-02-10 RX ADMIN — TAMOXIFEN CITRATE SCH MG: 10 TABLET, FILM COATED ORAL at 21:20

## 2022-02-11 RX ADMIN — MEPERIDINE HYDROCHLORIDE PRN MG: 50 INJECTION INTRAMUSCULAR; INTRAVENOUS; SUBCUTANEOUS at 16:32

## 2022-02-11 RX ADMIN — DOCUSATE SODIUM,SENNOSIDES SCH TABLET: 50; 8.6 TABLET, FILM COATED ORAL at 09:28

## 2022-02-11 RX ADMIN — TAMOXIFEN CITRATE SCH MG: 10 TABLET, FILM COATED ORAL at 21:44

## 2022-02-11 RX ADMIN — PANTOPRAZOLE SODIUM SCH MG: 40 TABLET, DELAYED RELEASE ORAL at 09:28

## 2022-02-11 RX ADMIN — VENLAFAXINE HYDROCHLORIDE SCH MG: 37.5 CAPSULE, EXTENDED RELEASE ORAL at 09:27

## 2022-02-11 RX ADMIN — MEPERIDINE HYDROCHLORIDE PRN MG: 50 INJECTION INTRAMUSCULAR; INTRAVENOUS; SUBCUTANEOUS at 21:41

## 2022-02-11 RX ADMIN — MEPERIDINE HYDROCHLORIDE PRN MG: 50 INJECTION INTRAMUSCULAR; INTRAVENOUS; SUBCUTANEOUS at 10:14

## 2022-02-11 RX ADMIN — MEPERIDINE HYDROCHLORIDE PRN MG: 50 INJECTION INTRAMUSCULAR; INTRAVENOUS; SUBCUTANEOUS at 02:23

## 2022-02-11 RX ADMIN — DOCUSATE SODIUM,SENNOSIDES SCH TABLET: 50; 8.6 TABLET, FILM COATED ORAL at 21:43

## 2022-02-11 RX ADMIN — POLYETHYLENE GLYCOL 3350 SCH GM: 17 POWDER, FOR SOLUTION ORAL at 09:28

## 2022-02-11 RX ADMIN — LEVOTHYROXINE SODIUM SCH MCG: 75 TABLET ORAL at 06:44

## 2022-02-12 LAB
ALBUMIN SERPL-MCNC: 3.4 G/DL (ref 3.4–5)
ALP SERPL-CCNC: 45 U/L (ref 45–117)
ALT SERPL-CCNC: 22 U/L (ref 13–61)
ANION GAP SERPL CALC-SCNC: 4 MMOL/L (ref 8–16)
AST SERPL-CCNC: 24 U/L (ref 15–37)
BILIRUB SERPL-MCNC: 0.6 MG/DL (ref 0.2–1)
BUN SERPL-MCNC: 8 MG/DL (ref 7–18)
CALCIUM SERPL-MCNC: 8.5 MG/DL (ref 8.5–10)
CHLORIDE SERPL-SCNC: 101 MMOL/L (ref 98–107)
CO2 SERPL-SCNC: 32 MMOL/L (ref 21–32)
CREAT SERPL-MCNC: 0.7 MG/DL (ref 0.55–1.3)
DEPRECATED RDW RBC AUTO: 13.2 % (ref 11.6–15.6)
GLUCOSE SERPL-MCNC: 91 MG/DL (ref 74–106)
HCT VFR BLD CALC: 38.8 % (ref 32.4–45.2)
HGB BLD-MCNC: 12.9 GM/DL (ref 10.7–15.3)
LIPASE SERPL-CCNC: 143 U/L (ref 73–393)
MCH RBC QN AUTO: 29 PG (ref 25.7–33.7)
MCHC RBC AUTO-ENTMCNC: 33.2 G/DL (ref 32–36)
MCV RBC: 87.3 FL (ref 80–96)
PLATELET # BLD AUTO: 166 10^3/UL (ref 134–434)
PMV BLD: 9.7 FL (ref 7.5–11.1)
PROT SERPL-MCNC: 6.1 G/DL (ref 6.4–8.2)
RBC # BLD AUTO: 4.45 M/MM3 (ref 3.6–5.2)
SODIUM SERPL-SCNC: 137 MMOL/L (ref 136–145)
WBC # BLD AUTO: 3.6 K/MM3 (ref 4–10)

## 2022-02-12 PROCEDURE — 0WUF4JZ SUPPLEMENT ABDOMINAL WALL WITH SYNTHETIC SUBSTITUTE, PERCUTANEOUS ENDOSCOPIC APPROACH: ICD-10-PCS | Performed by: SURGERY

## 2022-02-12 PROCEDURE — 3E03329 INTRODUCTION OF OTHER ANTI-INFECTIVE INTO PERIPHERAL VEIN, PERCUTANEOUS APPROACH: ICD-10-PCS | Performed by: SURGERY

## 2022-02-12 PROCEDURE — 3E033GC INTRODUCTION OF OTHER THERAPEUTIC SUBSTANCE INTO PERIPHERAL VEIN, PERCUTANEOUS APPROACH: ICD-10-PCS | Performed by: SURGERY

## 2022-02-12 PROCEDURE — 3E0337Z INTRODUCTION OF ELECTROLYTIC AND WATER BALANCE SUBSTANCE INTO PERIPHERAL VEIN, PERCUTANEOUS APPROACH: ICD-10-PCS | Performed by: SURGERY

## 2022-02-12 PROCEDURE — 3E023NZ INTRODUCTION OF ANALGESICS, HYPNOTICS, SEDATIVES INTO MUSCLE, PERCUTANEOUS APPROACH: ICD-10-PCS | Performed by: SURGERY

## 2022-02-12 PROCEDURE — 3E033NZ INTRODUCTION OF ANALGESICS, HYPNOTICS, SEDATIVES INTO PERIPHERAL VEIN, PERCUTANEOUS APPROACH: ICD-10-PCS | Performed by: SURGERY

## 2022-02-12 RX ADMIN — LEVOTHYROXINE SODIUM SCH MCG: 75 TABLET ORAL at 06:36

## 2022-02-12 RX ADMIN — DOCUSATE SODIUM,SENNOSIDES SCH TABLET: 50; 8.6 TABLET, FILM COATED ORAL at 09:41

## 2022-02-12 RX ADMIN — TAMOXIFEN CITRATE SCH MG: 10 TABLET, FILM COATED ORAL at 21:43

## 2022-02-12 RX ADMIN — PANTOPRAZOLE SODIUM SCH MG: 40 TABLET, DELAYED RELEASE ORAL at 09:41

## 2022-02-12 RX ADMIN — VENLAFAXINE HYDROCHLORIDE SCH MG: 37.5 CAPSULE, EXTENDED RELEASE ORAL at 09:41

## 2022-02-12 RX ADMIN — TAMOXIFEN CITRATE SCH MG: 10 TABLET, FILM COATED ORAL at 21:44

## 2022-02-12 RX ADMIN — DOCUSATE SODIUM,SENNOSIDES SCH TABLET: 50; 8.6 TABLET, FILM COATED ORAL at 21:43

## 2022-02-12 RX ADMIN — POLYETHYLENE GLYCOL 3350 SCH GM: 17 POWDER, FOR SOLUTION ORAL at 09:41

## 2022-02-13 VITALS — DIASTOLIC BLOOD PRESSURE: 67 MMHG | SYSTOLIC BLOOD PRESSURE: 111 MMHG | HEART RATE: 82 BPM | TEMPERATURE: 97.9 F

## 2022-02-13 RX ADMIN — VENLAFAXINE HYDROCHLORIDE SCH: 37.5 CAPSULE, EXTENDED RELEASE ORAL at 10:20

## 2022-02-13 RX ADMIN — LEVOTHYROXINE SODIUM SCH MCG: 75 TABLET ORAL at 06:50

## 2022-02-13 RX ADMIN — PANTOPRAZOLE SODIUM SCH MG: 40 TABLET, DELAYED RELEASE ORAL at 09:25

## 2022-02-13 RX ADMIN — DOCUSATE SODIUM,SENNOSIDES SCH TABLET: 50; 8.6 TABLET, FILM COATED ORAL at 09:25

## 2022-02-13 RX ADMIN — POLYETHYLENE GLYCOL 3350 SCH GM: 17 POWDER, FOR SOLUTION ORAL at 09:25

## 2022-02-14 ENCOUNTER — RX RENEWAL (OUTPATIENT)
Age: 47
End: 2022-02-14

## 2022-06-20 ENCOUNTER — APPOINTMENT (OUTPATIENT)
Dept: BREAST CENTER | Facility: CLINIC | Age: 47
End: 2022-06-20
Payer: COMMERCIAL

## 2022-06-20 VITALS
HEIGHT: 71 IN | HEART RATE: 73 BPM | WEIGHT: 179 LBS | BODY MASS INDEX: 25.06 KG/M2 | DIASTOLIC BLOOD PRESSURE: 83 MMHG | SYSTOLIC BLOOD PRESSURE: 123 MMHG | OXYGEN SATURATION: 97 %

## 2022-06-20 PROCEDURE — 99213 OFFICE O/P EST LOW 20 MIN: CPT

## 2022-06-20 NOTE — PHYSICAL EXAM
[Normocephalic] : normocephalic [Atraumatic] : atraumatic [EOMI] : extra ocular movement intact [Supple] : supple [No Supraclavicular Adenopathy] : no supraclavicular adenopathy [No Cervical Adenopathy] : no cervical adenopathy [Examined in the supine and seated position] : examined in the supine and seated position [No dominant masses] : no dominant masses in right breast  [No dominant masses] : no dominant masses left breast [No Nipple Retraction] : no left nipple retraction [No Nipple Discharge] : no left nipple discharge [Breast Abnormal Secretion Opalescent Fluid Right] : milky discharge [Breast Mass Right Breast ___cm] : no masses [Breast Abnormal Secretion Serous Fluid Left] : serous discharge [Breast Mass Left Breast ___cm] : no masses [No Axillary Lymphadenopathy] : no left axillary lymphadenopathy [No Edema] : no edema [No Rashes] : no rashes [No Ulceration] : no ulceration [Breast Nipple Inversion] : nipples not inverted [Breast Nipple Retraction] : nipples not retracted [Breast Nipple Flattening] : nipples not flattened [Breast Nipple Fissures] : nipples not fissured [Breast Abnormal Lactation (Galactorrhea)] : no galactorrhea [Breast Abnormal Secretion Bloody Fluid] : no bloody discharge [de-identified] : On exam, the patient is a well-healed left breast periareolar incision and ptotic small B-cup breasts.  On palpation, she has some mild fibrosis but no evidence of recurrence.  She does have a fair amount of tenderness after the radiation.  She has no axillary, supraclavicular, or cervical adenopathy. [de-identified] : Well-healed left breast periareolar incision after partial mastectomy for DCIS with no evidence of recurrence but tenderness under incision site [de-identified] : She has noted some clear discharge from the left nipple and some milky discharge from the right nipple which has been intermittent.

## 2022-06-20 NOTE — REASON FOR VISIT
[Follow-Up: _____] : a [unfilled] follow-up visit [FreeTextEntry1] : The patient comes in with a history of undergoing a left breast lower outer quadrant wide excision for DCIS performed in February 2021 which was ER/IA positive.  She underwent external beam radiation and was placed on tamoxifen.  She comes in for routine follow-up.

## 2022-06-20 NOTE — ASSESSMENT
[FreeTextEntry1] : The patient is a 46-year-old G2, P2 postmenopausal white female of [place] descent.  She underwent menarche at age 10 and had her first child at age 28.  She underwent a hysterectomy in 2011 but kept her ovaries and never took any hormone replacement therapy.  She has no family history of breast or ovarian cancer.  Her maternal grandfather had prostate cancer at age 79 and a maternal uncle had colorectal cancer at age 73.  Her father had lung cancer at age 54.  A maternal aunt had liver cancer at age 56.  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  She underwent a left breast wide excision on February 6, 2020 and final pathology just showed stromal fibrosis but no further atypia.  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  She underwent an MRI which showed no multifocal or contralateral disease.  She underwent genetic testing through [facility] which was negative.  She then underwent a left breast partial mastectomy at [facility] on February 19, 2021 and final pathology showed no further DCIS or cancer.   She then underwent external beam radiation through Dr. Hanna in [place] which finished in April 2021.  On exam today, she has typical scarring changes in the left breast but no evidence of recurrence.  She complained of a lot of fatigue after the radiation and continues to have tenderness underneath the left breast wide excision site.  She was started on tamoxifen understood the recurrence benefits.  She does say she had some clear nipple discharge on the left side and some whitish nipple discharge from the right but this is likely benign given her negative exam and diagnostic imaging.  She underwent her last bilateral mammography and ultrasound which was reviewed from January 7, 2022 and performed at  [place] which showed just postsurgical changes in the left breast and a small residual seroma.  Follow-up diagnostic left breast ultrasound was recommended in 6 months and she has a scheduled in July 2022 and I will follow-up on the report.  She was reassured and should follow-up again in 6 months around December 2022.  Her next bilateral mammography and ultrasound will be due in January 2023 and she was given prescriptions.  She was complaining of some pain in the left breast on her last visit and was offered symptomatic relief and she is also getting scar therapy at [place] where she works.

## 2022-06-20 NOTE — PAST MEDICAL HISTORY
[Menarche Age ____] : age at menarche was [unfilled] [Total Preg ___] : G[unfilled] [Live Births ___] : P[unfilled]  [Age At Live Birth ___] : Age at live birth: [unfilled] [History of Hormone Replacement Treatment] : has no history of hormone replacement treatment [de-identified] : s/p ABBEY but kept ovaries

## 2022-06-20 NOTE — HISTORY OF PRESENT ILLNESS
[FreeTextEntry1] : The patient is a 46-year-old G2, P2 postmenopausal white female of Arabic descent.  She underwent menarche at age 10 and had her first child at age 28.  She underwent a hysterectomy in 2011 but kept her ovaries and never took any hormone replacement therapy.  She has no family history of breast or ovarian cancer.  Her maternal grandfather had prostate cancer at age 79 and a maternal uncle had colorectal cancer at age 73.  Her father had lung cancer at age 54.  A maternal aunt had liver cancer at age 56.  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  She underwent a left breast wide excision on February 6, 2020 and final pathology just showed stromal fibrosis but no further atypia.  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  She underwent an MRI which showed no multifocal or contralateral disease.  She underwent genetic testing through [facility] which was negative.  She then underwent a left breast partial mastectomy at [facility] on February 19, 2021 and final pathology showed no further DCIS or cancer.  She then underwent external beam radiation to Dr. Hanna in [place] which finished in April 2021.  She was started on tamoxifen.  She continues to complain of left breast tenderness.  She continues to get yearly mammography and ultrasound.

## 2022-11-18 ENCOUNTER — RX RENEWAL (OUTPATIENT)
Age: 47
End: 2022-11-18

## 2022-11-18 RX ORDER — TAMOXIFEN CITRATE 20 MG/1
20 TABLET, FILM COATED ORAL DAILY
Qty: 90 | Refills: 2 | Status: ACTIVE | COMMUNITY
Start: 2021-06-02 | End: 1900-01-01

## 2022-12-05 ENCOUNTER — HOSPITAL ENCOUNTER (OUTPATIENT)
Dept: HOSPITAL 74 - JASU-ENDO | Age: 47
Discharge: HOME | End: 2022-12-05
Attending: INTERNAL MEDICINE
Payer: COMMERCIAL

## 2022-12-05 VITALS — HEART RATE: 68 BPM | DIASTOLIC BLOOD PRESSURE: 82 MMHG | SYSTOLIC BLOOD PRESSURE: 122 MMHG

## 2022-12-05 VITALS — TEMPERATURE: 98.2 F

## 2022-12-05 VITALS — RESPIRATION RATE: 18 BRPM

## 2022-12-05 VITALS — BODY MASS INDEX: 26.6 KG/M2

## 2022-12-05 DIAGNOSIS — K59.00: ICD-10-CM

## 2022-12-05 DIAGNOSIS — Z12.11: Primary | ICD-10-CM

## 2022-12-05 PROCEDURE — 0DJD8ZZ INSPECTION OF LOWER INTESTINAL TRACT, VIA NATURAL OR ARTIFICIAL OPENING ENDOSCOPIC: ICD-10-PCS | Performed by: INTERNAL MEDICINE

## 2022-12-06 ENCOUNTER — APPOINTMENT (OUTPATIENT)
Dept: BREAST CENTER | Facility: CLINIC | Age: 47
End: 2022-12-06

## 2022-12-06 VITALS — BODY MASS INDEX: 25.06 KG/M2 | WEIGHT: 179 LBS | HEIGHT: 71 IN

## 2022-12-06 DIAGNOSIS — Z87.898 PERSONAL HISTORY OF OTHER SPECIFIED CONDITIONS: ICD-10-CM

## 2022-12-06 PROCEDURE — 99213 OFFICE O/P EST LOW 20 MIN: CPT

## 2022-12-06 NOTE — HISTORY OF PRESENT ILLNESS
[FreeTextEntry1] : The patient is a 47-year-old G2, P2 postmenopausal white female of Hebrew descent.  She underwent menarche at age 10 and had her first child at age 28.  She underwent a hysterectomy in 2011 but kept her ovaries and never took any hormone replacement therapy.  She has no family history of breast or ovarian cancer.  Her maternal grandfather had prostate cancer at age 79 and a maternal uncle had colorectal cancer at age 73.  Her father had lung cancer at age 54.  A maternal aunt had liver cancer at age 56.  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  She underwent a left breast wide excision on February 6, 2020 and final pathology just showed stromal fibrosis but no further atypia.  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  She underwent an MRI which showed no multifocal or contralateral disease.  She underwent genetic testing through [facility] which was negative.  She then underwent a left breast partial mastectomy at [facility] on February 19, 2021 and final pathology showed no further DCIS or cancer.  She then underwent external beam radiation to Dr. Hanna in [place] which finished in April 2021.  She was started on tamoxifen.  She continues to complain of left breast tenderness.  She continues to get yearly mammography and ultrasound.

## 2022-12-06 NOTE — ASSESSMENT
[FreeTextEntry1] : The patient is a 47-year-old G2, P2 postmenopausal white female of Vietnamese descent.  She underwent menarche at age 10 and had her first child at age 28.  She underwent a hysterectomy in 2011 but kept her ovaries and never took any hormone replacement therapy.  She has no family history of breast or ovarian cancer.  Her maternal grandfather had prostate cancer at age 79 and a maternal uncle had colorectal cancer at age 73.  Her father had lung cancer at age 54.  A maternal aunt had liver cancer at age 56.  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  She underwent a left breast wide excision on February 6, 2020 and final pathology just showed stromal fibrosis but no further atypia.  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  She underwent an MRI which showed no multifocal or contralateral disease.  She underwent genetic testing through [facility] which was negative.  She then underwent a left breast partial mastectomy at [facility] on February 19, 2021 and final pathology showed no further DCIS or cancer.   She then underwent external beam radiation through Dr. Hanna in [place] which finished in April 2021.  On exam today, she has typical scarring changes in the left breast but no evidence of recurrence.  She complained of a lot of fatigue after the radiation and continues to have tenderness underneath the left breast wide excision site and on the lateral left chest wall.  She remains on tamoxifen understood the recurrence benefits.  She does say she still has some whitish nipple discharge from the right but this is likely benign given her negative exam and diagnostic imaging.  She underwent her last bilateral mammography and ultrasound which was reviewed from January 7, 2022 and performed at  [place] which showed just postsurgical changes in the left breast and a small residual seroma.  Follow-up diagnostic left breast ultrasound was recommended in 6 months and she had this performed at [facility] and I will follow-up on the report.  She was reassured and should follow-up again in 6 months around June 2023.  Her next bilateral mammography and ultrasound will be due in January 2023 and she was given prescriptions.  She is still complaining of some pain in the left breast and was offered symptomatic relief and she also received scar therapy at [place] where she works but the pain has continued.  I referred her to the pain management service here at [facility] for possible benefit of a rib/nerve block.  She remains on tamoxifen.

## 2022-12-06 NOTE — PHYSICAL EXAM
[Normocephalic] : normocephalic [Atraumatic] : atraumatic [EOMI] : extra ocular movement intact [Supple] : supple [No Supraclavicular Adenopathy] : no supraclavicular adenopathy [No Cervical Adenopathy] : no cervical adenopathy [Examined in the supine and seated position] : examined in the supine and seated position [No dominant masses] : no dominant masses in right breast  [No dominant masses] : no dominant masses left breast [No Nipple Retraction] : no left nipple retraction [No Nipple Discharge] : no left nipple discharge [Breast Abnormal Secretion Opalescent Fluid Right] : milky discharge [Breast Mass Right Breast ___cm] : no masses [Breast Abnormal Secretion Serous Fluid Left] : no serous discharge [Breast Mass Left Breast ___cm] : no masses [Breast Nipple Inversion] : nipples not inverted [Breast Nipple Retraction] : nipples not retracted [Breast Nipple Flattening] : nipples not flattened [Breast Nipple Fissures] : nipples not fissured [Breast Abnormal Lactation (Galactorrhea)] : no galactorrhea [Breast Abnormal Secretion Bloody Fluid] : no bloody discharge [No Axillary Lymphadenopathy] : no left axillary lymphadenopathy [No Edema] : no edema [No Rashes] : no rashes [No Ulceration] : no ulceration [de-identified] : On exam, the patient has a well-healed left breast periareolar incision and ptotic small B-cup breasts.  On palpation, she has some mild fibrosis but no evidence of recurrence.  She still has a fair amount of tenderness after the radiation on the lateral left chest wall.  She has no axillary, supraclavicular, or cervical adenopathy. [de-identified] : Well-healed left breast periareolar incision after partial mastectomy for DCIS with no evidence of recurrence but tenderness on the lateral left chest wall [de-identified] : She has noted some milky discharge from the right nipple which has been intermittent.

## 2022-12-06 NOTE — HISTORY OF PRESENT ILLNESS
[FreeTextEntry1] : The patient is a 47-year-old G2, P2 postmenopausal white female of Vietnamese descent.  She underwent menarche at age 10 and had her first child at age 28.  She underwent a hysterectomy in 2011 but kept her ovaries and never took any hormone replacement therapy.  She has no family history of breast or ovarian cancer.  Her maternal grandfather had prostate cancer at age 79 and a maternal uncle had colorectal cancer at age 73.  Her father had lung cancer at age 54.  A maternal aunt had liver cancer at age 56.  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  She underwent a left breast wide excision on February 6, 2020 and final pathology just showed stromal fibrosis but no further atypia.  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  She underwent an MRI which showed no multifocal or contralateral disease.  She underwent genetic testing through [facility] which was negative.  She then underwent a left breast partial mastectomy at [facility] on February 19, 2021 and final pathology showed no further DCIS or cancer.  She then underwent external beam radiation to Dr. Hanna in [place] which finished in April 2021.  She was started on tamoxifen.  She continues to complain of left breast tenderness.  She continues to get yearly mammography and ultrasound.

## 2022-12-06 NOTE — PAST MEDICAL HISTORY
[Menarche Age ____] : age at menarche was [unfilled] [History of Hormone Replacement Treatment] : has no history of hormone replacement treatment [Total Preg ___] : G[unfilled] [Live Births ___] : P[unfilled]  [Age At Live Birth ___] : Age at live birth: [unfilled] [de-identified] : s/p ABBEY but kept ovaries

## 2022-12-06 NOTE — REASON FOR VISIT
[Follow-Up: _____] : a [unfilled] follow-up visit [FreeTextEntry1] : The patient comes in with a history of undergoing a left breast lower outer quadrant wide excision for DCIS performed in February 2021 which was ER/ME positive.  She underwent external beam radiation and was placed on tamoxifen.  She comes in for routine follow-up.

## 2022-12-06 NOTE — PAST MEDICAL HISTORY
[Menarche Age ____] : age at menarche was [unfilled] [History of Hormone Replacement Treatment] : has no history of hormone replacement treatment [Total Preg ___] : G[unfilled] [Live Births ___] : P[unfilled]  [Age At Live Birth ___] : Age at live birth: [unfilled] [de-identified] : s/p ABBEY but kept ovaries

## 2022-12-06 NOTE — ASSESSMENT
[FreeTextEntry1] : The patient is a 47-year-old G2, P2 postmenopausal white female of [place] descent.  She underwent menarche at age 10 and had her first child at age 28.  She underwent a hysterectomy in 2011 but kept her ovaries and never took any hormone replacement therapy.  She has no family history of breast or ovarian cancer.  Her maternal grandfather had prostate cancer at age 79 and a maternal uncle had colorectal cancer at age 73.  Her father had lung cancer at age 54.  A maternal aunt had liver cancer at age 56.  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  She underwent a left breast wide excision on February 6, 2020 and final pathology just showed stromal fibrosis but no further atypia.  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  She underwent an MRI which showed no multifocal or contralateral disease.  She underwent genetic testing through [facility] which was negative.  She then underwent a left breast partial mastectomy at [facility] on February 19, 2021 and final pathology showed no further DCIS or cancer.   She then underwent external beam radiation through Dr. Hanna in [place] which finished in April 2021.  On exam today, she has typical scarring changes in the left breast but no evidence of recurrence.  She complained of a lot of fatigue after the radiation and continues to have tenderness underneath the left breast wide excision site and on the lateral left chest wall.  She remains on tamoxifen understood the recurrence benefits.  She does say she still has some whitish nipple discharge from the right but this is likely benign given her negative exam and diagnostic imaging.  She underwent her last bilateral mammography and ultrasound which was reviewed from January 7, 2022 and performed at  [place] which showed just postsurgical changes in the left breast and a small residual seroma.  Follow-up diagnostic left breast ultrasound was recommended in 6 months and she had this performed at [facility] and I will follow-up on the report.  She was reassured and should follow-up again in 6 months around June 2023.  Her next bilateral mammography and ultrasound will be due in January 2023 and she was given prescriptions.  She is still complaining of some pain in the left breast and was offered symptomatic relief and she also received scar therapy at [place] where she works but the pain has continued.  I referred her to the pain management service here at [facility] for possible benefit of a rib/nerve block.  She remains on tamoxifen.

## 2022-12-06 NOTE — PHYSICAL EXAM
[Normocephalic] : normocephalic [Atraumatic] : atraumatic [EOMI] : extra ocular movement intact [Supple] : supple [No Supraclavicular Adenopathy] : no supraclavicular adenopathy [No Cervical Adenopathy] : no cervical adenopathy [Examined in the supine and seated position] : examined in the supine and seated position [No dominant masses] : no dominant masses in right breast  [No dominant masses] : no dominant masses left breast [No Nipple Retraction] : no left nipple retraction [No Nipple Discharge] : no left nipple discharge [Breast Abnormal Secretion Opalescent Fluid Right] : milky discharge [Breast Mass Right Breast ___cm] : no masses [Breast Abnormal Secretion Serous Fluid Left] : no serous discharge [Breast Mass Left Breast ___cm] : no masses [Breast Nipple Inversion] : nipples not inverted [Breast Nipple Retraction] : nipples not retracted [Breast Nipple Flattening] : nipples not flattened [Breast Nipple Fissures] : nipples not fissured [Breast Abnormal Lactation (Galactorrhea)] : no galactorrhea [Breast Abnormal Secretion Bloody Fluid] : no bloody discharge [No Axillary Lymphadenopathy] : no left axillary lymphadenopathy [No Edema] : no edema [No Rashes] : no rashes [No Ulceration] : no ulceration [de-identified] : On exam, the patient has a well-healed left breast periareolar incision and ptotic small B-cup breasts.  On palpation, she has some mild fibrosis but no evidence of recurrence.  She still has a fair amount of tenderness after the radiation on the lateral left chest wall.  She has no axillary, supraclavicular, or cervical adenopathy. [de-identified] : Well-healed left breast periareolar incision after partial mastectomy for DCIS with no evidence of recurrence but tenderness on the lateral left chest wall [de-identified] : She has noted some milky discharge from the right nipple which has been intermittent.

## 2022-12-06 NOTE — REASON FOR VISIT
[Follow-Up: _____] : a [unfilled] follow-up visit [FreeTextEntry1] : The patient comes in with a history of undergoing a left breast lower outer quadrant wide excision for DCIS performed in February 2021 which was ER/OH positive.  She underwent external beam radiation and was placed on tamoxifen.  She comes in for routine follow-up.

## 2022-12-20 ENCOUNTER — APPOINTMENT (OUTPATIENT)
Dept: BREAST CENTER | Facility: CLINIC | Age: 47
End: 2022-12-20

## 2023-01-19 ENCOUNTER — NON-APPOINTMENT (OUTPATIENT)
Age: 48
End: 2023-01-19

## 2023-01-20 ENCOUNTER — NON-APPOINTMENT (OUTPATIENT)
Age: 48
End: 2023-01-20

## 2023-04-12 ENCOUNTER — HOSPITAL ENCOUNTER (EMERGENCY)
Dept: HOSPITAL 74 - FER | Age: 48
Discharge: HOME | End: 2023-04-12
Payer: COMMERCIAL

## 2023-04-12 VITALS — TEMPERATURE: 98.5 F | SYSTOLIC BLOOD PRESSURE: 98 MMHG | DIASTOLIC BLOOD PRESSURE: 67 MMHG | HEART RATE: 57 BPM

## 2023-04-12 VITALS — BODY MASS INDEX: 28.8 KG/M2

## 2023-04-12 VITALS — RESPIRATION RATE: 18 BRPM

## 2023-04-12 DIAGNOSIS — R25.2: Primary | ICD-10-CM

## 2023-04-12 DIAGNOSIS — R06.02: ICD-10-CM

## 2023-04-12 LAB
ALBUMIN SERPL-MCNC: 4 G/DL (ref 3.4–5)
ALP SERPL-CCNC: 57 U/L (ref 45–117)
ALT SERPL-CCNC: 18 U/L (ref 13–61)
ANION GAP SERPL CALC-SCNC: 10 MMOL/L (ref 8–16)
APTT BLD: 28.2 SECONDS (ref 25.2–36.5)
AST SERPL-CCNC: 20 U/L (ref 15–37)
BASOPHILS # BLD: 0.8 % (ref 0–2)
BILIRUB SERPL-MCNC: 0.5 MG/DL (ref 0.2–1)
BUN SERPL-MCNC: 13 MG/DL (ref 7–18)
CALCIUM SERPL-MCNC: 8.8 MG/DL (ref 8.5–10)
CHLORIDE SERPL-SCNC: 101 MMOL/L (ref 98–107)
CO2 SERPL-SCNC: 27 MMOL/L (ref 21–32)
CREAT SERPL-MCNC: 0.7 MG/DL (ref 0.55–1.3)
DEPRECATED RDW RBC AUTO: 14.1 % (ref 11.6–15.6)
EOSINOPHIL # BLD: 2.8 % (ref 0–4.5)
GLUCOSE SERPL-MCNC: 80 MG/DL (ref 74–106)
HCT VFR BLD CALC: 39.3 % (ref 32.4–45.2)
HGB BLD-MCNC: 13.5 GM/DL (ref 10.7–15.3)
INR BLD: 0.98 (ref 0.83–1.09)
LYMPHOCYTES # BLD: 23.5 % (ref 8–40)
MAGNESIUM SERPL-MCNC: 2 MG/DL (ref 1.8–2.4)
MCH RBC QN AUTO: 29.8 PG (ref 25.7–33.7)
MCHC RBC AUTO-ENTMCNC: 34.3 G/DL (ref 32–36)
MCV RBC: 86.8 FL (ref 80–96)
MONOCYTES # BLD AUTO: 5.6 % (ref 3.8–10.2)
NEUTROPHILS # BLD: 67.3 % (ref 42.8–82.8)
PHOSPHATE SERPL-MCNC: 3.4 MG/DL (ref 2.5–4.9)
PLATELET # BLD AUTO: 213 10^3/UL (ref 134–434)
PMV BLD: 9 FL (ref 7.5–11.1)
PROT SERPL-MCNC: 7.2 G/DL (ref 6.4–8.2)
PT PNL PPP: 11.3 SEC (ref 9.7–13)
RBC # BLD AUTO: 4.53 M/MM3 (ref 3.6–5.2)
SODIUM SERPL-SCNC: 138 MMOL/L (ref 136–145)
WBC # BLD AUTO: 7.9 K/MM3 (ref 4–10)

## 2023-04-12 PROCEDURE — 3E0337Z INTRODUCTION OF ELECTROLYTIC AND WATER BALANCE SUBSTANCE INTO PERIPHERAL VEIN, PERCUTANEOUS APPROACH: ICD-10-PCS | Performed by: EMERGENCY MEDICINE

## 2023-04-12 PROCEDURE — 3E033NZ INTRODUCTION OF ANALGESICS, HYPNOTICS, SEDATIVES INTO PERIPHERAL VEIN, PERCUTANEOUS APPROACH: ICD-10-PCS | Performed by: EMERGENCY MEDICINE

## 2023-04-12 PROCEDURE — 3E0333Z INTRODUCTION OF ANTI-INFLAMMATORY INTO PERIPHERAL VEIN, PERCUTANEOUS APPROACH: ICD-10-PCS | Performed by: EMERGENCY MEDICINE

## 2023-08-21 ENCOUNTER — NON-APPOINTMENT (OUTPATIENT)
Age: 48
End: 2023-08-21

## 2023-08-21 NOTE — REASON FOR VISIT
[Follow-Up: _____] : a [unfilled] follow-up visit [FreeTextEntry1] : The patient comes in with a history of undergoing a left breast lower outer quadrant wide excision for DCIS performed in February 2021 which was ER/FL positive.  She underwent external beam radiation and was placed on tamoxifen.  She comes in for routine follow-up.

## 2023-08-21 NOTE — ASSESSMENT
[FreeTextEntry1] : The patient is a 47-year-old G2, P2 postmenopausal white female of Bulgarian descent.  She underwent menarche at age 10 and had her first child at age 28.  She underwent a hysterectomy in 2011 but kept her ovaries and never took any hormone replacement therapy.  She has no family history of breast or ovarian cancer.  Her maternal grandfather had prostate cancer at age 79 and a maternal uncle had colorectal cancer at age 73.  Her father had lung cancer at age 54.  A maternal aunt had liver cancer at age 56.  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  She underwent a left breast wide excision on February 6, 2020 and final pathology just showed stromal fibrosis but no further atypia.  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  She underwent an MRI which showed no multifocal or contralateral disease.  She underwent genetic testing through [facility] which was negative.  She then underwent a left breast partial mastectomy at [facility] on February 19, 2021 and final pathology showed no further DCIS or cancer.   She then underwent external beam radiation through Dr. Hanna in [place] which finished in April 2021.  On exam today, she has typical scarring changes in the left breast but no evidence of recurrence.  She complained of a lot of fatigue after the radiation and continues to have tenderness underneath the left breast wide excision site and on the lateral left chest wall.  She remains on tamoxifen and understood the recurrence benefits.  She does say she still has some whitish nipple discharge from the right but this is likely benign given her negative exam and diagnostic imaging.  She underwent her last bilateral mammography and ultrasound which was reviewed from January 7, 2022 and performed at  [place] which showed just postsurgical changes in the left breast and a small residual seroma.  Follow-up diagnostic left breast ultrasound was recommended in 6 months and she had this performed at [facility] and I will follow-up on the report (??????? did we get the [place] report  from [facility] ?????).  She was reassured and should follow-up again in 6 months around June 2023.  Her next bilateral mammography and ultrasound will be due in January 2023 and she was given prescriptions.  She is still complaining of some pain in the left breast and was offered symptomatic relief and she also received scar therapy at [place] where she works but the pain has continued.  I referred her to the pain management service here at [facility] for possible benefit of a rib/nerve block.  She remains on tamoxifen.  ??? why is she here again ?????

## 2023-08-21 NOTE — PHYSICAL EXAM
[Normocephalic] : normocephalic [Atraumatic] : atraumatic [EOMI] : extra ocular movement intact [Supple] : supple [No Supraclavicular Adenopathy] : no supraclavicular adenopathy [No Cervical Adenopathy] : no cervical adenopathy [Examined in the supine and seated position] : examined in the supine and seated position [No dominant masses] : no dominant masses in right breast  [No dominant masses] : no dominant masses left breast [No Nipple Retraction] : no left nipple retraction [No Nipple Discharge] : no left nipple discharge [Breast Abnormal Secretion Opalescent Fluid Right] : milky discharge [Breast Mass Right Breast ___cm] : no masses [Breast Mass Left Breast ___cm] : no masses [No Axillary Lymphadenopathy] : no left axillary lymphadenopathy [No Edema] : no edema [No Rashes] : no rashes [No Ulceration] : no ulceration [Breast Abnormal Secretion Serous Fluid Left] : no serous discharge [Breast Nipple Inversion] : nipples not inverted [Breast Nipple Retraction] : nipples not retracted [Breast Nipple Flattening] : nipples not flattened [Breast Nipple Fissures] : nipples not fissured [Breast Abnormal Lactation (Galactorrhea)] : no galactorrhea [Breast Abnormal Secretion Bloody Fluid] : no bloody discharge [de-identified] : On exam, the patient has a well-healed left breast periareolar incision and ptotic small B-cup breasts.  On palpation, she has some mild fibrosis but no evidence of recurrence.  She still has a fair amount of tenderness after the radiation on the lateral left chest wall.  She has no axillary, supraclavicular, or cervical adenopathy. [de-identified] : Well-healed left breast periareolar incision after partial mastectomy for DCIS with no evidence of recurrence but tenderness on the lateral left chest wall [de-identified] : She has noted some milky discharge from the right nipple which has been intermittent.

## 2023-08-21 NOTE — HISTORY OF PRESENT ILLNESS
[FreeTextEntry1] : The patient is a 47-year-old G2, P2 postmenopausal white female of [place] descent.  She underwent menarche at age 10 and had her first child at age 28.  She underwent a hysterectomy in 2011 but kept her ovaries and never took any hormone replacement therapy.  She has no family history of breast or ovarian cancer.  Her maternal grandfather had prostate cancer at age 79 and a maternal uncle had colorectal cancer at age 73.  Her father had lung cancer at age 54.  A maternal aunt had liver cancer at age 56.  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  She underwent a left breast wide excision on February 6, 2020 and final pathology just showed stromal fibrosis but no further atypia.  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  She underwent an MRI which showed no multifocal or contralateral disease.  She underwent genetic testing through [facility] which was negative.  She then underwent a left breast partial mastectomy at [facility] on February 19, 2021 and final pathology showed no further DCIS or cancer.  She then underwent external beam radiation to Dr. Hanna in [place] which finished in April 2021.  She was started on tamoxifen.  She continues to complain of left breast tenderness.  She continues to get yearly mammography and ultrasound.

## 2023-08-21 NOTE — PAST MEDICAL HISTORY
[Menarche Age ____] : age at menarche was [unfilled] [Total Preg ___] : G[unfilled] [Live Births ___] : P[unfilled]  [Age At Live Birth ___] : Age at live birth: [unfilled] [History of Hormone Replacement Treatment] : has no history of hormone replacement treatment [de-identified] : s/p ABBEY but kept ovaries

## 2023-08-22 NOTE — HISTORY OF PRESENT ILLNESS
[FreeTextEntry1] : The patient is a 47-year-old G2, P2 postmenopausal white female of [place] descent.  She underwent menarche at age 10 and had her first child at age 28.  She underwent a hysterectomy in 2011 but kept her ovaries and never took any hormone replacement therapy.  She has no family history of breast or ovarian cancer.  Her maternal grandfather had prostate cancer at age 79 and a maternal uncle had colorectal cancer at age 73.  Her father had lung cancer at age 54.  A maternal aunt had liver cancer at age 56.  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  She underwent a left breast wide excision on February 6, 2020 and final pathology just showed stromal fibrosis but no further atypia.  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  She underwent an MRI which showed no multifocal or contralateral disease.  She underwent [facility] genetic panel testing through [employer] in February 2021 which was negative.  She then underwent a left breast partial mastectomy at [facility] on February 19, 2021 and final pathology showed no further DCIS or cancer.  She then underwent external beam radiation to Dr. Hanna in [place] which finished in April 2021.  She was started on tamoxifen.  She continues to complain of left breast tenderness.  She continues to get yearly mammography and ultrasound.

## 2023-08-22 NOTE — ASSESSMENT
[FreeTextEntry1] : The patient is a 47-year-old G2, P2 postmenopausal white female of Pasht[place] descent.  She underwent menarche at age 10 and had her first child at age 28.  She underwent a hysterectomy in 2011 but kept her ovaries and never took any hormone replacement therapy.  She has no family history of breast or ovarian cancer.  Her maternal grandfather had prostate cancer at age 79 and a maternal uncle had colorectal cancer at age 73.  Her father had lung cancer at age 54.  A maternal aunt had liver cancer at age 56.  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  She underwent a left breast wide excision on February 6, 2020 and final pathology just showed stromal fibrosis but no further atypia.  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  She underwent an MRI which showed no multifocal or contralateral disease.  She underwent [facility] genetic panel testing through [facility] in February 2021 which was negative.  She then underwent a left breast partial mastectomy at [facility] on February 19, 2021 and final pathology showed no further DCIS or cancer.   She then underwent external beam radiation through Dr. Hanna in [place] which finished in April 2021.  On exam today, she has typical scarring changes in the left breast but no evidence of recurrence.  She complained of a lot of fatigue after the radiation and continues to have tenderness underneath the left breast wide excision site and on the lateral left chest wall.  She remains on tamoxifen and understood the recurrence benefits.  She does say she still has some whitish nipple discharge from the right but this is likely benign given her negative exam and diagnostic imaging.  She underwent her last bilateral mammography and ultrasound which was reviewed from January 13, 2023 and performed at [place] which showed just postsurgical changes in the left breast with no suspicious findings.  She was reassured and should follow-up again in 6 months around February 2024.  Her next bilateral mammography and ultrasound will be due in January 2024 and she was given prescriptions.  She is still complaining of some pain in the left breast and was offered symptomatic relief and she also received scar therapy at [place] where she works but the pain has continued.  I referred her to the pain management service here at [place] for possible benefit of a rib/nerve block ???????.  She remains on tamoxifen.

## 2023-08-22 NOTE — PHYSICAL EXAM
[Breast Abnormal Secretion Serous Fluid Left] : no serous discharge [Breast Nipple Inversion] : nipples not inverted [Breast Nipple Retraction] : nipples not retracted [Breast Nipple Flattening] : nipples not flattened [Breast Nipple Fissures] : nipples not fissured [Breast Abnormal Lactation (Galactorrhea)] : no galactorrhea [Breast Abnormal Secretion Bloody Fluid] : no bloody discharge [de-identified] : On exam, the patient has a well-healed left breast periareolar incision and ptotic small B-cup breasts.  On palpation, she has some mild fibrosis but no evidence of recurrence.  She still has a fair amount of tenderness after the radiation on the lateral left chest wall.  She has no axillary, supraclavicular, or cervical adenopathy. [de-identified] : Well-healed left breast periareolar incision after partial mastectomy for DCIS with no evidence of recurrence but tenderness on the lateral left chest wall [de-identified] : She has noted some milky discharge from the right nipple which has been intermittent.

## 2023-08-22 NOTE — REASON FOR VISIT
[FreeTextEntry1] : The patient comes in with a history of undergoing a left breast lower outer quadrant wide excision for DCIS performed in February 2021 which was ER/NM positive.  She underwent external beam radiation and was placed on tamoxifen.  She comes in for routine follow-up.

## 2023-08-22 NOTE — PAST MEDICAL HISTORY
[History of Hormone Replacement Treatment] : has no history of hormone replacement treatment [de-identified] : s/p ABBEY but kept ovaries

## 2023-08-29 ENCOUNTER — TRANSCRIPTION ENCOUNTER (OUTPATIENT)
Age: 48
End: 2023-08-29

## 2023-08-29 ENCOUNTER — APPOINTMENT (OUTPATIENT)
Dept: BREAST CENTER | Facility: CLINIC | Age: 48
End: 2023-08-29
Payer: COMMERCIAL

## 2023-08-29 VITALS — WEIGHT: 179 LBS | HEIGHT: 71 IN | BODY MASS INDEX: 25.06 KG/M2

## 2023-08-29 DIAGNOSIS — N60.12 DIFFUSE CYSTIC MASTOPATHY OF LEFT BREAST: ICD-10-CM

## 2023-08-29 DIAGNOSIS — N60.11 DIFFUSE CYSTIC MASTOPATHY OF LEFT BREAST: ICD-10-CM

## 2023-08-29 DIAGNOSIS — Z90.12 ACQUIRED ABSENCE OF LEFT BREAST AND NIPPLE: ICD-10-CM

## 2023-08-29 DIAGNOSIS — Z85.3 PERSONAL HISTORY OF MALIGNANT NEOPLASM OF BREAST: ICD-10-CM

## 2023-08-29 PROCEDURE — 99213 OFFICE O/P EST LOW 20 MIN: CPT

## 2023-08-29 NOTE — ASSESSMENT
[FreeTextEntry1] : The patient is a 47-year-old G2, P2 postmenopausal white female of [place] descent.  She underwent menarche at age 10 and had her first child at age 28.  She underwent a hysterectomy in 2011 but kept her ovaries and never took any hormone replacement therapy.  She has no family history of breast or ovarian cancer.  Her maternal grandfather had prostate cancer at age 79 and a maternal uncle had colorectal cancer at age 73.  Her father had lung cancer at age 54.  A maternal aunt had liver cancer at age 56.  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  She underwent a left breast wide excision on February 6, 2020 and final pathology just showed stromal fibrosis but no further atypia.  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  She underwent an MRI which showed no multifocal or contralateral disease.  She underwent [facility] genetic panel testing through [facility] in February 2021 which was negative.  She then underwent a left breast partial mastectomy at [facility] on February 19, 2021 and final pathology showed no further DCIS or cancer.   She then underwent external beam radiation through Dr. Hanna in [place] which finished in April 2021.  On exam today, she has typical scarring changes in the left breast but no evidence of recurrence.  She complained of a lot of fatigue after the radiation and continues to have tenderness underneath the left breast wide excision site and on the lateral left chest wall.  She remains on tamoxifen and understood the recurrence benefits.  She does say she still has some whitish nipple discharge from the right but this is likely benign.  She underwent her last bilateral mammography and ultrasound which was reviewed from August 22, 2023 and performed at [facility] as diagnostic imaging studies ordered by her primary care physician Dr. Uriel Hurtado due to her right nipple discharge which showed just postsurgical changes in the left breast with no suspicious findings.  He also ordered a bilateral breast MRI which was performed in August 2023 but I do not have the official report yet.  She was reassured and should follow-up again in 6 months around February 2024 and I will follow-up on the MRI report.  Her next bilateral mammography and ultrasound will be due in again in August 2024 and she was given prescriptions.  This nipple discharge has been chronic and is likely benign.  She is still complaining of some pain in the left breast and was offered symptomatic relief and she also received scar therapy at [place] where she works but the pain has continued.  She never saw the pain management service here.  She is complaining of some pelvic pains and does have a septated cyst on her ovary and was asking for a GYN oncology referral and I gave her Dr. Robledo's card.  She remains on tamoxifen.

## 2023-08-29 NOTE — PAST MEDICAL HISTORY
[Menarche Age ____] : age at menarche was [unfilled] [Total Preg ___] : G[unfilled] [Live Births ___] : P[unfilled]  [Age At Live Birth ___] : Age at live birth: [unfilled] [History of Hormone Replacement Treatment] : has no history of hormone replacement treatment [de-identified] : s/p ABBEY but kept ovaries

## 2023-08-29 NOTE — HISTORY OF PRESENT ILLNESS
[FreeTextEntry1] : The patient is a 47-year-old G2, P2 postmenopausal white female of [place] descent.  She underwent menarche at age 10 and had her first child at age 28.  She underwent a hysterectomy in 2011 but kept her ovaries and never took any hormone replacement therapy.  She has no family history of breast or ovarian cancer.  Her maternal grandfather had prostate cancer at age 79 and a maternal uncle had colorectal cancer at age 73.  Her father had lung cancer at age 54.  A maternal aunt had liver cancer at age 56.  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  She underwent a left breast wide excision on February 6, 2020 and final pathology just showed stromal fibrosis but no further atypia.  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  She underwent an MRI which showed no multifocal or contralateral disease.  She underwent [facility] genetic panel testing through [employer] in February 2021 which was negative.  She then underwent a left breast partial mastectomy at [facility] on February 19, 2021 and final pathology showed no further DCIS or cancer.  She then underwent external beam radiation to Dr. Hanna in [place] which finished in April 2021.  She was started on tamoxifen.  She continues to complain of left breast tenderness.  She continues to get yearly mammography and ultrasound.

## 2023-08-29 NOTE — PHYSICAL EXAM
[Normocephalic] : normocephalic [Atraumatic] : atraumatic [EOMI] : extra ocular movement intact [Supple] : supple [No Supraclavicular Adenopathy] : no supraclavicular adenopathy [No Cervical Adenopathy] : no cervical adenopathy [Examined in the supine and seated position] : examined in the supine and seated position [No dominant masses] : no dominant masses in right breast  [No dominant masses] : no dominant masses left breast [No Nipple Retraction] : no left nipple retraction [No Nipple Discharge] : no left nipple discharge [Breast Abnormal Secretion Opalescent Fluid Right] : milky discharge [Breast Mass Right Breast ___cm] : no masses [Breast Mass Left Breast ___cm] : no masses [No Axillary Lymphadenopathy] : no left axillary lymphadenopathy [No Edema] : no edema [No Rashes] : no rashes [No Ulceration] : no ulceration [Breast Abnormal Secretion Serous Fluid Left] : no serous discharge [Breast Nipple Inversion] : nipples not inverted [Breast Nipple Retraction] : nipples not retracted [Breast Nipple Flattening] : nipples not flattened [Breast Nipple Fissures] : nipples not fissured [Breast Abnormal Lactation (Galactorrhea)] : no galactorrhea [Breast Abnormal Secretion Bloody Fluid] : no bloody discharge [de-identified] : On exam, the patient has a well-healed left breast periareolar incision and ptotic small B-cup breasts.  On palpation, she has some mild fibrosis but no evidence of recurrence.  She still has a fair amount of tenderness after the radiation on the lateral left chest wall.  She has no axillary, supraclavicular, or cervical adenopathy. [de-identified] : Well-healed left breast periareolar incision after partial mastectomy for DCIS with no evidence of recurrence but tenderness on the lateral left chest wall [de-identified] : She has noted some milky discharge from the right nipple which has been intermittent.

## 2023-08-29 NOTE — REASON FOR VISIT
[Follow-Up: _____] : a [unfilled] follow-up visit [FreeTextEntry1] : The patient comes in with a history of undergoing a left breast lower outer quadrant wide excision for DCIS performed in February 2021 which was ER/[place] positive.  She underwent external beam radiation and was placed on tamoxifen.  She comes in for routine follow-up.

## 2023-11-22 ENCOUNTER — RX RENEWAL (OUTPATIENT)
Age: 48
End: 2023-11-22

## 2023-11-22 ENCOUNTER — NON-APPOINTMENT (OUTPATIENT)
Age: 48
End: 2023-11-22

## 2023-11-22 RX ORDER — TAMOXIFEN CITRATE 20 MG/1
20 TABLET, FILM COATED ORAL
Qty: 90 | Refills: 2 | Status: ACTIVE | COMMUNITY
Start: 2023-01-17 | End: 1900-01-01

## 2024-01-03 DIAGNOSIS — N64.4 MASTODYNIA: ICD-10-CM

## 2024-09-23 DIAGNOSIS — R92.8 OTHER ABNORMAL AND INCONCLUSIVE FINDINGS ON DIAGNOSTIC IMAGING OF BREAST: ICD-10-CM

## 2024-10-07 ENCOUNTER — APPOINTMENT (OUTPATIENT)
Dept: BREAST CENTER | Facility: CLINIC | Age: 49
End: 2024-10-07

## 2024-10-15 ENCOUNTER — HOSPITAL ENCOUNTER (OUTPATIENT)
Dept: HOSPITAL 74 - FRADUS-SUR | Age: 49
Discharge: HOME | End: 2024-10-15
Attending: PHYSICIAN ASSISTANT
Payer: COMMERCIAL

## 2024-10-15 DIAGNOSIS — N60.32: Primary | ICD-10-CM

## 2024-10-15 PROCEDURE — 0HBU3ZX EXCISION OF LEFT BREAST, PERCUTANEOUS APPROACH, DIAGNOSTIC: ICD-10-PCS

## 2024-10-15 PROCEDURE — A4648 IMPLANTABLE TISSUE MARKER: HCPCS

## 2024-10-18 ENCOUNTER — NON-APPOINTMENT (OUTPATIENT)
Age: 49
End: 2024-10-18

## 2024-10-23 DIAGNOSIS — D24.2 BENIGN NEOPLASM OF LEFT BREAST: ICD-10-CM

## 2024-10-24 ENCOUNTER — APPOINTMENT (OUTPATIENT)
Dept: BREAST CENTER | Facility: CLINIC | Age: 49
End: 2024-10-24
Payer: COMMERCIAL

## 2024-10-24 DIAGNOSIS — N60.12 DIFFUSE CYSTIC MASTOPATHY OF LEFT BREAST: ICD-10-CM

## 2024-10-24 DIAGNOSIS — Z90.12 ACQUIRED ABSENCE OF LEFT BREAST AND NIPPLE: ICD-10-CM

## 2024-10-24 DIAGNOSIS — Z85.3 PERSONAL HISTORY OF MALIGNANT NEOPLASM OF BREAST: ICD-10-CM

## 2024-10-24 DIAGNOSIS — Z12.39 ENCOUNTER FOR OTHER SCREENING FOR MALIGNANT NEOPLASM OF BREAST: ICD-10-CM

## 2024-10-24 DIAGNOSIS — N60.11 DIFFUSE CYSTIC MASTOPATHY OF LEFT BREAST: ICD-10-CM

## 2024-10-24 PROCEDURE — 99213 OFFICE O/P EST LOW 20 MIN: CPT

## 2025-03-22 ENCOUNTER — NON-APPOINTMENT (OUTPATIENT)
Age: 50
End: 2025-03-22

## 2025-03-25 ENCOUNTER — NON-APPOINTMENT (OUTPATIENT)
Age: 50
End: 2025-03-25

## 2025-03-27 ENCOUNTER — RESULT REVIEW (OUTPATIENT)
Age: 50
End: 2025-03-27

## 2025-04-29 ENCOUNTER — APPOINTMENT (OUTPATIENT)
Dept: BREAST CENTER | Facility: CLINIC | Age: 50
End: 2025-04-29
Payer: COMMERCIAL

## 2025-04-29 VITALS
SYSTOLIC BLOOD PRESSURE: 137 MMHG | OXYGEN SATURATION: 98 % | HEIGHT: 69 IN | DIASTOLIC BLOOD PRESSURE: 86 MMHG | WEIGHT: 217 LBS | BODY MASS INDEX: 32.14 KG/M2 | HEART RATE: 71 BPM

## 2025-04-29 DIAGNOSIS — N60.11 DIFFUSE CYSTIC MASTOPATHY OF LEFT BREAST: ICD-10-CM

## 2025-04-29 DIAGNOSIS — Z85.3 PERSONAL HISTORY OF MALIGNANT NEOPLASM OF BREAST: ICD-10-CM

## 2025-04-29 DIAGNOSIS — Z12.39 ENCOUNTER FOR OTHER SCREENING FOR MALIGNANT NEOPLASM OF BREAST: ICD-10-CM

## 2025-04-29 DIAGNOSIS — N60.12 DIFFUSE CYSTIC MASTOPATHY OF LEFT BREAST: ICD-10-CM

## 2025-04-29 DIAGNOSIS — Z90.12 ACQUIRED ABSENCE OF LEFT BREAST AND NIPPLE: ICD-10-CM

## 2025-04-29 PROCEDURE — 99213 OFFICE O/P EST LOW 20 MIN: CPT

## 2025-05-27 ENCOUNTER — APPOINTMENT (OUTPATIENT)
Dept: PLASTIC SURGERY | Facility: CLINIC | Age: 50
End: 2025-05-27